# Patient Record
Sex: FEMALE | Race: WHITE | NOT HISPANIC OR LATINO | URBAN - METROPOLITAN AREA
[De-identification: names, ages, dates, MRNs, and addresses within clinical notes are randomized per-mention and may not be internally consistent; named-entity substitution may affect disease eponyms.]

---

## 2017-04-03 ENCOUNTER — OUTPATIENT (OUTPATIENT)
Dept: OUTPATIENT SERVICES | Facility: HOSPITAL | Age: 32
LOS: 1 days | End: 2017-04-03
Payer: COMMERCIAL

## 2017-04-03 VITALS
WEIGHT: 145.95 LBS | RESPIRATION RATE: 16 BRPM | SYSTOLIC BLOOD PRESSURE: 123 MMHG | OXYGEN SATURATION: 99 % | TEMPERATURE: 98 F | HEIGHT: 63 IN | HEART RATE: 62 BPM | DIASTOLIC BLOOD PRESSURE: 82 MMHG

## 2017-04-03 DIAGNOSIS — Z90.10 ACQUIRED ABSENCE OF UNSPECIFIED BREAST AND NIPPLE: Chronic | ICD-10-CM

## 2017-04-03 DIAGNOSIS — Z01.818 ENCOUNTER FOR OTHER PREPROCEDURAL EXAMINATION: ICD-10-CM

## 2017-04-03 DIAGNOSIS — Z90.13 ACQUIRED ABSENCE OF BILATERAL BREASTS AND NIPPLES: ICD-10-CM

## 2017-04-03 DIAGNOSIS — Z90.49 ACQUIRED ABSENCE OF OTHER SPECIFIED PARTS OF DIGESTIVE TRACT: Chronic | ICD-10-CM

## 2017-04-03 DIAGNOSIS — Z85.3 PERSONAL HISTORY OF MALIGNANT NEOPLASM OF BREAST: ICD-10-CM

## 2017-04-03 LAB
ANION GAP SERPL CALC-SCNC: 15 MMOL/L — SIGNIFICANT CHANGE UP (ref 5–17)
BUN SERPL-MCNC: 13 MG/DL — SIGNIFICANT CHANGE UP (ref 7–23)
CALCIUM SERPL-MCNC: 9.3 MG/DL — SIGNIFICANT CHANGE UP (ref 8.4–10.5)
CHLORIDE SERPL-SCNC: 103 MMOL/L — SIGNIFICANT CHANGE UP (ref 96–108)
CO2 SERPL-SCNC: 21 MMOL/L — LOW (ref 22–31)
CREAT SERPL-MCNC: 0.62 MG/DL — SIGNIFICANT CHANGE UP (ref 0.5–1.3)
GLUCOSE SERPL-MCNC: 125 MG/DL — HIGH (ref 70–99)
HCT VFR BLD CALC: 36.1 % — SIGNIFICANT CHANGE UP (ref 34.5–45)
HGB BLD-MCNC: 11.7 G/DL — SIGNIFICANT CHANGE UP (ref 11.5–15.5)
MCHC RBC-ENTMCNC: 30.9 PG — SIGNIFICANT CHANGE UP (ref 27–34)
MCHC RBC-ENTMCNC: 32.4 GM/DL — SIGNIFICANT CHANGE UP (ref 32–36)
MCV RBC AUTO: 95.3 FL — SIGNIFICANT CHANGE UP (ref 80–100)
PLATELET # BLD AUTO: 231 K/UL — SIGNIFICANT CHANGE UP (ref 150–400)
POTASSIUM SERPL-MCNC: 4.6 MMOL/L — SIGNIFICANT CHANGE UP (ref 3.5–5.3)
POTASSIUM SERPL-SCNC: 4.6 MMOL/L — SIGNIFICANT CHANGE UP (ref 3.5–5.3)
RBC # BLD: 3.79 M/UL — LOW (ref 3.8–5.2)
RBC # FLD: 13.2 % — SIGNIFICANT CHANGE UP (ref 10.3–14.5)
SODIUM SERPL-SCNC: 139 MMOL/L — SIGNIFICANT CHANGE UP (ref 135–145)
WBC # BLD: 6.23 K/UL — SIGNIFICANT CHANGE UP (ref 3.8–10.5)
WBC # FLD AUTO: 6.23 K/UL — SIGNIFICANT CHANGE UP (ref 3.8–10.5)

## 2017-04-03 PROCEDURE — 85027 COMPLETE CBC AUTOMATED: CPT

## 2017-04-03 PROCEDURE — 80048 BASIC METABOLIC PNL TOTAL CA: CPT

## 2017-04-03 RX ORDER — CEFAZOLIN SODIUM 1 G
2000 VIAL (EA) INJECTION ONCE
Qty: 0 | Refills: 0 | Status: DISCONTINUED | OUTPATIENT
Start: 2017-04-10 | End: 2017-04-25

## 2017-04-03 RX ORDER — SODIUM CHLORIDE 9 MG/ML
3 INJECTION INTRAMUSCULAR; INTRAVENOUS; SUBCUTANEOUS EVERY 8 HOURS
Qty: 0 | Refills: 0 | Status: DISCONTINUED | OUTPATIENT
Start: 2017-04-10 | End: 2017-04-25

## 2017-04-03 RX ORDER — CELECOXIB 200 MG/1
200 CAPSULE ORAL ONCE
Qty: 0 | Refills: 0 | Status: COMPLETED | OUTPATIENT
Start: 2017-04-10 | End: 2017-04-10

## 2017-04-03 RX ORDER — ACETAMINOPHEN 500 MG
975 TABLET ORAL ONCE
Qty: 0 | Refills: 0 | Status: COMPLETED | OUTPATIENT
Start: 2017-04-10 | End: 2017-04-10

## 2017-04-03 RX ORDER — LIDOCAINE HCL 20 MG/ML
0.2 VIAL (ML) INJECTION ONCE
Qty: 0 | Refills: 0 | Status: DISCONTINUED | OUTPATIENT
Start: 2017-04-10 | End: 2017-04-25

## 2017-04-03 RX ORDER — APREPITANT 80 MG/1
40 CAPSULE ORAL ONCE
Qty: 0 | Refills: 0 | Status: COMPLETED | OUTPATIENT
Start: 2017-04-10 | End: 2017-04-10

## 2017-04-03 NOTE — H&P PST ADULT - PSH
Normal spontaneous vaginal delivery  ' 2015  Status post appendectomy  ' 2008:  laproscopic  Status post mastectomy  ' 2016:  Bilateral MRM / Right Axillary Lymph Node Dissection ( 1 node +)

## 2017-04-03 NOTE — H&P PST ADULT - HISTORY OF PRESENT ILLNESS
This is a 32 y/o female with PMH: dx(9/2016): Right Breast Intraductal Carcinoma, intermediate grade: surgically treated/ chemotherapy and Tamoxifen. Now scheduled: Revision Bilateral Breast Reconstruction with Implants Acellular Dermal Matrix.

## 2017-04-09 ENCOUNTER — RESULT REVIEW (OUTPATIENT)
Age: 32
End: 2017-04-09

## 2017-04-10 ENCOUNTER — OUTPATIENT (OUTPATIENT)
Dept: OUTPATIENT SERVICES | Facility: HOSPITAL | Age: 32
LOS: 1 days | Discharge: HOME | End: 2017-04-10
Payer: COMMERCIAL

## 2017-04-10 VITALS
OXYGEN SATURATION: 100 % | RESPIRATION RATE: 16 BRPM | HEART RATE: 67 BPM | DIASTOLIC BLOOD PRESSURE: 90 MMHG | SYSTOLIC BLOOD PRESSURE: 136 MMHG

## 2017-04-10 VITALS
OXYGEN SATURATION: 100 % | RESPIRATION RATE: 18 BRPM | SYSTOLIC BLOOD PRESSURE: 120 MMHG | TEMPERATURE: 98 F | HEIGHT: 63 IN | WEIGHT: 145.95 LBS | HEART RATE: 68 BPM | DIASTOLIC BLOOD PRESSURE: 82 MMHG

## 2017-04-10 DIAGNOSIS — Z90.49 ACQUIRED ABSENCE OF OTHER SPECIFIED PARTS OF DIGESTIVE TRACT: Chronic | ICD-10-CM

## 2017-04-10 DIAGNOSIS — Z85.3 PERSONAL HISTORY OF MALIGNANT NEOPLASM OF BREAST: ICD-10-CM

## 2017-04-10 DIAGNOSIS — Z90.10 ACQUIRED ABSENCE OF UNSPECIFIED BREAST AND NIPPLE: Chronic | ICD-10-CM

## 2017-04-10 PROCEDURE — 19342 INSJ/RPLCMT BRST IMPLT SEP D: CPT | Mod: 50

## 2017-04-10 PROCEDURE — 88304 TISSUE EXAM BY PATHOLOGIST: CPT | Mod: 26

## 2017-04-10 PROCEDURE — C1789: CPT

## 2017-04-10 PROCEDURE — 88304 TISSUE EXAM BY PATHOLOGIST: CPT

## 2017-04-10 RX ORDER — CELECOXIB 200 MG/1
200 CAPSULE ORAL ONCE
Qty: 0 | Refills: 0 | Status: COMPLETED | OUTPATIENT
Start: 2017-04-10 | End: 2017-04-10

## 2017-04-10 RX ORDER — OXYCODONE HYDROCHLORIDE 5 MG/1
5 TABLET ORAL ONCE
Qty: 0 | Refills: 0 | Status: DISCONTINUED | OUTPATIENT
Start: 2017-04-10 | End: 2017-04-10

## 2017-04-10 RX ORDER — ONDANSETRON 8 MG/1
4 TABLET, FILM COATED ORAL ONCE
Qty: 0 | Refills: 0 | Status: COMPLETED | OUTPATIENT
Start: 2017-04-10 | End: 2017-04-10

## 2017-04-10 RX ORDER — SODIUM CHLORIDE 9 MG/ML
1000 INJECTION, SOLUTION INTRAVENOUS
Qty: 0 | Refills: 0 | Status: DISCONTINUED | OUTPATIENT
Start: 2017-04-10 | End: 2017-04-25

## 2017-04-10 RX ORDER — HYDROMORPHONE HYDROCHLORIDE 2 MG/ML
0.5 INJECTION INTRAMUSCULAR; INTRAVENOUS; SUBCUTANEOUS
Qty: 0 | Refills: 0 | Status: DISCONTINUED | OUTPATIENT
Start: 2017-04-10 | End: 2017-04-10

## 2017-04-10 RX ADMIN — CELECOXIB 200 MILLIGRAM(S): 200 CAPSULE ORAL at 09:33

## 2017-04-10 RX ADMIN — CELECOXIB 200 MILLIGRAM(S): 200 CAPSULE ORAL at 12:12

## 2017-04-10 RX ADMIN — OXYCODONE HYDROCHLORIDE 5 MILLIGRAM(S): 5 TABLET ORAL at 12:12

## 2017-04-10 RX ADMIN — Medication 975 MILLIGRAM(S): at 09:33

## 2017-04-10 RX ADMIN — Medication 975 MILLIGRAM(S): at 12:55

## 2017-04-10 RX ADMIN — HYDROMORPHONE HYDROCHLORIDE 0.5 MILLIGRAM(S): 2 INJECTION INTRAMUSCULAR; INTRAVENOUS; SUBCUTANEOUS at 13:15

## 2017-04-10 RX ADMIN — APREPITANT 40 MILLIGRAM(S): 80 CAPSULE ORAL at 09:33

## 2017-04-10 RX ADMIN — ONDANSETRON 4 MILLIGRAM(S): 8 TABLET, FILM COATED ORAL at 12:12

## 2017-04-13 LAB — SURGICAL PATHOLOGY STUDY: SIGNIFICANT CHANGE UP

## 2017-04-17 ENCOUNTER — TRANSCRIPTION ENCOUNTER (OUTPATIENT)
Age: 32
End: 2017-04-17

## 2017-04-19 DIAGNOSIS — Z79.810 LONG TERM (CURRENT) USE OF SELECTIVE ESTROGEN RECEPTOR MODULATORS (SERMS): ICD-10-CM

## 2017-04-19 DIAGNOSIS — Z85.3 PERSONAL HISTORY OF MALIGNANT NEOPLASM OF BREAST: ICD-10-CM

## 2017-04-19 DIAGNOSIS — M95.4 ACQUIRED DEFORMITY OF CHEST AND RIB: ICD-10-CM

## 2017-04-19 DIAGNOSIS — Z90.13 ACQUIRED ABSENCE OF BILATERAL BREASTS AND NIPPLES: ICD-10-CM

## 2018-01-04 ENCOUNTER — RESULT REVIEW (OUTPATIENT)
Age: 33
End: 2018-01-04

## 2018-05-24 ENCOUNTER — NON-APPOINTMENT (OUTPATIENT)
Age: 33
End: 2018-05-24

## 2018-05-24 ENCOUNTER — APPOINTMENT (OUTPATIENT)
Dept: INTERNAL MEDICINE | Facility: CLINIC | Age: 33
End: 2018-05-24
Payer: COMMERCIAL

## 2018-05-24 VITALS — WEIGHT: 140 LBS | BODY MASS INDEX: 24.5 KG/M2 | HEIGHT: 63.5 IN

## 2018-05-24 DIAGNOSIS — Z78.9 OTHER SPECIFIED HEALTH STATUS: ICD-10-CM

## 2018-05-24 DIAGNOSIS — Z86.19 PERSONAL HISTORY OF OTHER INFECTIOUS AND PARASITIC DISEASES: ICD-10-CM

## 2018-05-24 DIAGNOSIS — Z82.3 FAMILY HISTORY OF STROKE: ICD-10-CM

## 2018-05-24 DIAGNOSIS — C50.911 MALIGNANT NEOPLASM OF UNSPECIFIED SITE OF RIGHT FEMALE BREAST: ICD-10-CM

## 2018-05-24 PROCEDURE — 93000 ELECTROCARDIOGRAM COMPLETE: CPT

## 2018-05-24 PROCEDURE — 99385 PREV VISIT NEW AGE 18-39: CPT | Mod: 25

## 2018-05-24 PROCEDURE — 94010 BREATHING CAPACITY TEST: CPT

## 2018-05-24 PROCEDURE — 99000 SPECIMEN HANDLING OFFICE-LAB: CPT

## 2018-05-24 PROCEDURE — 36415 COLL VENOUS BLD VENIPUNCTURE: CPT

## 2018-05-24 RX ORDER — CHROMIUM 200 MCG
1000 TABLET ORAL DAILY
Qty: 30 | Refills: 3 | Status: ACTIVE | COMMUNITY
Start: 2018-05-24

## 2018-05-24 NOTE — PHYSICAL EXAM

## 2018-05-24 NOTE — ASSESSMENT
[FreeTextEntry1] : Blood work was drawn and sent to the lab today. The patient has been instructed to call the office next week to discuss today's lab work.\par \par Routine health counselling provided\par \par Onc/GYN f/u\par Optho evaluation per routine\par \par Annual CC

## 2018-05-24 NOTE — HISTORY OF PRESENT ILLNESS
[de-identified] : Pt presents for her annual physical. \par She had breast CA in 2016. Has regular f/u with oncology.\par No acute complaints except for occasional tenderness of scalp.\par Feels bloated from Tamoxifen

## 2018-05-24 NOTE — HEALTH RISK ASSESSMENT
[Excellent] : ~his/her~  mood as  excellent [No falls in past year] : Patient reported no falls in the past year [0] : 2) Feeling down, depressed, or hopeless: Not at all (0) [With Significant Other] : lives with significant other [Unemployed] : unemployed [] :  [# Of Children ___] : has [unfilled] children [Sexually Active] : sexually active [Feels Safe at Home] : Feels safe at home [Fully functional (bathing, dressing, toileting, transferring, walking, feeding)] : Fully functional (bathing, dressing, toileting, transferring, walking, feeding) [Fully functional (using the telephone, shopping, preparing meals, housekeeping, doing laundry, using] : Fully functional and needs no help or supervision to perform IADLs (using the telephone, shopping, preparing meals, housekeeping, doing laundry, using transportation, managing medications and managing finances) [Smoke Detector] : smoke detector [Carbon Monoxide Detector] : carbon monoxide detector [Seat Belt] :  uses seat belt [] : No [[id number]] : 0 [Change in mental status noted] : No change in mental status noted [Language] : denies difficulty with language [Reports changes in hearing] : Reports no changes in hearing [Reports changes in vision] : Reports no changes in vision [Reports changes in dental health] : Reports no changes in dental health

## 2018-06-01 LAB
25(OH)D3 SERPL-MCNC: 53.4 NG/ML
ALBUMIN SERPL ELPH-MCNC: 4.7 G/DL
ALP BLD-CCNC: 30 U/L
ALT SERPL-CCNC: 24 U/L
ANION GAP SERPL CALC-SCNC: 15 MMOL/L
APPEARANCE: CLEAR
AST SERPL-CCNC: 36 U/L
BASOPHILS # BLD AUTO: 0.03 K/UL
BASOPHILS NFR BLD AUTO: 0.5 %
BILIRUB SERPL-MCNC: 0.5 MG/DL
BILIRUBIN URINE: NEGATIVE
BLOOD URINE: NEGATIVE
BUN SERPL-MCNC: 11 MG/DL
CALCIUM SERPL-MCNC: 9.8 MG/DL
CHLORIDE SERPL-SCNC: 101 MMOL/L
CHOLEST SERPL-MCNC: 174 MG/DL
CHOLEST/HDLC SERPL: 2.2 RATIO
CO2 SERPL-SCNC: 23 MMOL/L
COLOR: YELLOW
CREAT SERPL-MCNC: 0.63 MG/DL
EOSINOPHIL # BLD AUTO: 0.2 K/UL
EOSINOPHIL NFR BLD AUTO: 3.6 %
FOLATE SERPL-MCNC: >20 NG/ML
GLUCOSE QUALITATIVE U: NEGATIVE MG/DL
GLUCOSE SERPL-MCNC: 86 MG/DL
HBA1C MFR BLD HPLC: 4.5 %
HCT VFR BLD CALC: 37.5 %
HCV AB SER QL: NONREACTIVE
HCV S/CO RATIO: 0.1 S/CO
HDLC SERPL-MCNC: 80 MG/DL
HGB BLD-MCNC: 11.6 G/DL
HIV1+2 AB SPEC QL IA.RAPID: NONREACTIVE
IMM GRANULOCYTES NFR BLD AUTO: 0 %
KETONES URINE: NEGATIVE
LDLC SERPL CALC-MCNC: 76 MG/DL
LEUKOCYTE ESTERASE URINE: NEGATIVE
LYMPHOCYTES # BLD AUTO: 1.51 K/UL
LYMPHOCYTES NFR BLD AUTO: 27 %
MAGNESIUM SERPL-MCNC: 2.5 MG/DL
MAN DIFF?: NORMAL
MCHC RBC-ENTMCNC: 30.6 PG
MCHC RBC-ENTMCNC: 30.9 GM/DL
MCV RBC AUTO: 98.9 FL
MONOCYTES # BLD AUTO: 0.46 K/UL
MONOCYTES NFR BLD AUTO: 8.2 %
NEUTROPHILS # BLD AUTO: 3.4 K/UL
NEUTROPHILS NFR BLD AUTO: 60.7 %
NITRITE URINE: NEGATIVE
PH URINE: 8.5
PLATELET # BLD AUTO: 314 K/UL
POTASSIUM SERPL-SCNC: 4.3 MMOL/L
PROT SERPL-MCNC: 7.7 G/DL
PROTEIN URINE: NEGATIVE MG/DL
RBC # BLD: 3.79 M/UL
RBC # FLD: 13.8 %
SODIUM SERPL-SCNC: 139 MMOL/L
SPECIFIC GRAVITY URINE: 1.01
T4 FREE SERPL-MCNC: 1.3 NG/DL
T4 SERPL-MCNC: 6.9 UG/DL
TRIGL SERPL-MCNC: 91 MG/DL
TSH SERPL-ACNC: 1.35 UIU/ML
UROBILINOGEN URINE: NEGATIVE MG/DL
VIT B12 SERPL-MCNC: 677 PG/ML
WBC # FLD AUTO: 5.6 K/UL

## 2019-02-03 ENCOUNTER — RESULT REVIEW (OUTPATIENT)
Age: 34
End: 2019-02-03

## 2020-07-30 ENCOUNTER — APPOINTMENT (OUTPATIENT)
Dept: INTERNAL MEDICINE | Facility: CLINIC | Age: 35
End: 2020-07-30
Payer: COMMERCIAL

## 2020-07-30 ENCOUNTER — LABORATORY RESULT (OUTPATIENT)
Age: 35
End: 2020-07-30

## 2020-07-30 ENCOUNTER — NON-APPOINTMENT (OUTPATIENT)
Age: 35
End: 2020-07-30

## 2020-07-30 VITALS
OXYGEN SATURATION: 100 % | BODY MASS INDEX: 24.71 KG/M2 | WEIGHT: 143 LBS | RESPIRATION RATE: 16 BRPM | HEART RATE: 64 BPM | HEIGHT: 63.6 IN | TEMPERATURE: 98.2 F

## 2020-07-30 VITALS — SYSTOLIC BLOOD PRESSURE: 122 MMHG | DIASTOLIC BLOOD PRESSURE: 78 MMHG

## 2020-07-30 DIAGNOSIS — Z11.59 ENCOUNTER FOR SCREENING FOR OTHER VIRAL DISEASES: ICD-10-CM

## 2020-07-30 PROCEDURE — 99000 SPECIMEN HANDLING OFFICE-LAB: CPT

## 2020-07-30 PROCEDURE — 36415 COLL VENOUS BLD VENIPUNCTURE: CPT

## 2020-07-30 PROCEDURE — 99395 PREV VISIT EST AGE 18-39: CPT | Mod: 25

## 2020-07-30 PROCEDURE — 93000 ELECTROCARDIOGRAM COMPLETE: CPT | Mod: 59

## 2020-07-30 PROCEDURE — G0444 DEPRESSION SCREEN ANNUAL: CPT | Mod: NC,59

## 2020-07-30 RX ORDER — NICOTINE POLACRILEX 2 MG
1 GUM BUCCAL
Refills: 0 | Status: DISCONTINUED | COMMUNITY
Start: 2018-05-24 | End: 2020-07-30

## 2020-07-30 NOTE — HISTORY OF PRESENT ILLNESS
[de-identified] : Pt presents for her annual physical. \par She had breast CA in 2016. Has regular f/u with oncology.\par No acute complaints except for bloating from Tamoxifen

## 2020-07-30 NOTE — ASSESSMENT
[FreeTextEntry1] : Blood work was drawn and sent to the lab today. The patient has been instructed to call the office next week to discuss today's lab work.\par \par Routine health counselling provided\par \par Onc f/u every 6 months\par GYN f/u\par Optho evaluation per routine\par \par Flu vaccine in fall\par \par Annual CC

## 2020-07-30 NOTE — HEALTH RISK ASSESSMENT
[Excellent] : ~his/her~  mood as  excellent [Yes] : Yes [No] : In the past 12 months have you used drugs other than those required for medical reasons? No [0] : 2) Feeling down, depressed, or hopeless: Not at all (0) [No falls in past year] : Patient reported no falls in the past year [With Significant Other] : lives with significant other [Unemployed] : unemployed [# Of Children ___] : has [unfilled] children [] :  [Sexually Active] : sexually active [Feels Safe at Home] : Feels safe at home [Fully functional (bathing, dressing, toileting, transferring, walking, feeding)] : Fully functional (bathing, dressing, toileting, transferring, walking, feeding) [Fully functional (using the telephone, shopping, preparing meals, housekeeping, doing laundry, using] : Fully functional and needs no help or supervision to perform IADLs (using the telephone, shopping, preparing meals, housekeeping, doing laundry, using transportation, managing medications and managing finances) [Carbon Monoxide Detector] : carbon monoxide detector [Smoke Detector] : smoke detector [Seat Belt] :  uses seat belt [] : No [[id number]] : 0 [Change in mental status noted] : No change in mental status noted [Language] : denies difficulty with language [Reports changes in vision] : Reports no changes in vision [Reports changes in hearing] : Reports no changes in hearing [Reports changes in dental health] : Reports no changes in dental health

## 2020-07-30 NOTE — PHYSICAL EXAM
[No Acute Distress] : no acute distress [Well Nourished] : well nourished [Well Developed] : well developed [Well-Appearing] : well-appearing [Normal Sclera/Conjunctiva] : normal sclera/conjunctiva [PERRL] : pupils equal round and reactive to light [EOMI] : extraocular movements intact [Normal Outer Ear/Nose] : the outer ears and nose were normal in appearance [Normal Oropharynx] : the oropharynx was normal [No JVD] : no jugular venous distention [Supple] : supple [No Lymphadenopathy] : no lymphadenopathy [Thyroid Normal, No Nodules] : the thyroid was normal and there were no nodules present [No Respiratory Distress] : no respiratory distress  [Clear to Auscultation] : lungs were clear to auscultation bilaterally [No Accessory Muscle Use] : no accessory muscle use [Normal Rate] : normal rate  [Regular Rhythm] : with a regular rhythm [Normal S1, S2] : normal S1 and S2 [No Murmur] : no murmur heard [No Carotid Bruits] : no carotid bruits [No Abdominal Bruit] : a ~M bruit was not heard ~T in the abdomen [No Varicosities] : no varicosities [Pedal Pulses Present] : the pedal pulses are present [No Edema] : there was no peripheral edema [No Extremity Clubbing/Cyanosis] : no extremity clubbing/cyanosis [No Palpable Aorta] : no palpable aorta [Declined Breast Exam] : declined breast exam  [Soft] : abdomen soft [Non Tender] : non-tender [Non-distended] : non-distended [No HSM] : no HSM [No Masses] : no abdominal mass palpated [Normal Bowel Sounds] : normal bowel sounds [Normal Posterior Cervical Nodes] : no posterior cervical lymphadenopathy [Normal Anterior Cervical Nodes] : no anterior cervical lymphadenopathy [No CVA Tenderness] : no CVA  tenderness [No Spinal Tenderness] : no spinal tenderness [No Joint Swelling] : no joint swelling [Grossly Normal Strength/Tone] : grossly normal strength/tone [No Rash] : no rash [Normal Gait] : normal gait [Coordination Grossly Intact] : coordination grossly intact [No Focal Deficits] : no focal deficits [Deep Tendon Reflexes (DTR)] : deep tendon reflexes were 2+ and symmetric [Normal Affect] : the affect was normal [Normal Insight/Judgement] : insight and judgment were intact [de-identified] : b/l implants [de-identified] : emily Jordan  PAP 2/2020 neg [de-identified] : [facility]  FBE annually 6/20

## 2020-08-07 LAB
25(OH)D3 SERPL-MCNC: 66.1 NG/ML
ALBUMIN SERPL ELPH-MCNC: 4.9 G/DL
ALP BLD-CCNC: 38 U/L
ALT SERPL-CCNC: 16 U/L
ANION GAP SERPL CALC-SCNC: 13 MMOL/L
APPEARANCE: ABNORMAL
AST SERPL-CCNC: 23 U/L
BASOPHILS # BLD AUTO: 0.05 K/UL
BASOPHILS NFR BLD AUTO: 0.8 %
BILIRUB SERPL-MCNC: 1 MG/DL
BILIRUBIN URINE: NEGATIVE
BLOOD URINE: NEGATIVE
BUN SERPL-MCNC: 11 MG/DL
CALCIUM SERPL-MCNC: 10 MG/DL
CHLORIDE SERPL-SCNC: 104 MMOL/L
CHOLEST SERPL-MCNC: 145 MG/DL
CHOLEST/HDLC SERPL: 2.2 RATIO
CO2 SERPL-SCNC: 24 MMOL/L
COLOR: NORMAL
CREAT SERPL-MCNC: 0.69 MG/DL
EOSINOPHIL # BLD AUTO: 0.09 K/UL
EOSINOPHIL NFR BLD AUTO: 1.5 %
FOLATE SERPL-MCNC: >20 NG/ML
GLUCOSE QUALITATIVE U: NEGATIVE
GLUCOSE SERPL-MCNC: 83 MG/DL
HCT VFR BLD CALC: 38.9 %
HDLC SERPL-MCNC: 67 MG/DL
HGB BLD-MCNC: 12.2 G/DL
IMM GRANULOCYTES NFR BLD AUTO: 0.2 %
KETONES URINE: ABNORMAL
LDLC SERPL CALC-MCNC: 67 MG/DL
LEUKOCYTE ESTERASE URINE: ABNORMAL
LYMPHOCYTES # BLD AUTO: 1.56 K/UL
LYMPHOCYTES NFR BLD AUTO: 25.6 %
MAGNESIUM SERPL-MCNC: 2.1 MG/DL
MAN DIFF?: NORMAL
MCHC RBC-ENTMCNC: 30 PG
MCHC RBC-ENTMCNC: 31.4 GM/DL
MCV RBC AUTO: 95.8 FL
MONOCYTES # BLD AUTO: 0.7 K/UL
MONOCYTES NFR BLD AUTO: 11.5 %
NEUTROPHILS # BLD AUTO: 3.69 K/UL
NEUTROPHILS NFR BLD AUTO: 60.4 %
NITRITE URINE: NEGATIVE
PH URINE: 5.5
PLATELET # BLD AUTO: 240 K/UL
POTASSIUM SERPL-SCNC: 4.8 MMOL/L
PROT SERPL-MCNC: 7.1 G/DL
PROTEIN URINE: NEGATIVE
RBC # BLD: 4.06 M/UL
RBC # FLD: 13.3 %
SARS-COV-2 IGG SERPL IA-ACNC: 0.09 INDEX
SARS-COV-2 IGG SERPL QL IA: NEGATIVE
SODIUM SERPL-SCNC: 141 MMOL/L
SPECIFIC GRAVITY URINE: 1.01
T4 FREE SERPL-MCNC: 1 NG/DL
T4 SERPL-MCNC: 6.5 UG/DL
TRIGL SERPL-MCNC: 54 MG/DL
TSH SERPL-ACNC: 1.26 UIU/ML
UROBILINOGEN URINE: NORMAL
VIT B12 SERPL-MCNC: 663 PG/ML
WBC # FLD AUTO: 6.1 K/UL

## 2020-11-20 ENCOUNTER — TRANSCRIPTION ENCOUNTER (OUTPATIENT)
Age: 35
End: 2020-11-20

## 2021-01-28 ENCOUNTER — APPOINTMENT (OUTPATIENT)
Dept: PLASTIC SURGERY | Facility: CLINIC | Age: 36
End: 2021-01-28

## 2021-02-18 ENCOUNTER — APPOINTMENT (OUTPATIENT)
Dept: PLASTIC SURGERY | Facility: CLINIC | Age: 36
End: 2021-02-18
Payer: COMMERCIAL

## 2021-02-18 VITALS
OXYGEN SATURATION: 98 % | BODY MASS INDEX: 24.2 KG/M2 | SYSTOLIC BLOOD PRESSURE: 122 MMHG | HEART RATE: 71 BPM | HEIGHT: 63.6 IN | TEMPERATURE: 98.3 F | DIASTOLIC BLOOD PRESSURE: 81 MMHG | WEIGHT: 140 LBS

## 2021-02-18 DIAGNOSIS — Z92.21 PERSONAL HISTORY OF ANTINEOPLASTIC CHEMOTHERAPY: ICD-10-CM

## 2021-02-18 PROCEDURE — 99204 OFFICE O/P NEW MOD 45 MIN: CPT

## 2021-02-18 PROCEDURE — 99072 ADDL SUPL MATRL&STAF TM PHE: CPT

## 2021-02-25 ENCOUNTER — APPOINTMENT (OUTPATIENT)
Dept: PLASTIC SURGERY | Facility: CLINIC | Age: 36
End: 2021-02-25
Payer: COMMERCIAL

## 2021-02-25 VITALS
BODY MASS INDEX: 24.2 KG/M2 | SYSTOLIC BLOOD PRESSURE: 123 MMHG | HEART RATE: 63 BPM | DIASTOLIC BLOOD PRESSURE: 78 MMHG | WEIGHT: 140 LBS | TEMPERATURE: 98.1 F | HEIGHT: 63.6 IN | OXYGEN SATURATION: 100 %

## 2021-02-25 PROCEDURE — 99072 ADDL SUPL MATRL&STAF TM PHE: CPT

## 2021-02-25 PROCEDURE — 99214 OFFICE O/P EST MOD 30 MIN: CPT

## 2021-03-26 ENCOUNTER — APPOINTMENT (OUTPATIENT)
Dept: PLASTIC SURGERY | Facility: CLINIC | Age: 36
End: 2021-03-26
Payer: COMMERCIAL

## 2021-03-26 PROCEDURE — 99215 OFFICE O/P EST HI 40 MIN: CPT

## 2021-03-26 PROCEDURE — 99072 ADDL SUPL MATRL&STAF TM PHE: CPT

## 2021-03-26 NOTE — HISTORY OF PRESENT ILLNESS
[FreeTextEntry1] : 35 y.o.  F history of right breast CA diagnosed in 2016 (ER+, [place]+, HER2 -) s/p b/l mastectomies with right SLNBx (1+ micromets/6 lymph nodes), placement of b/l subpectoral tissue expanders s/p exchange for subpectoral silicone implants (mentor style III  smooth ultra high profile 535cc implant) in 2017. Pt unhappy with the appearance of her breasts and seeking revision options. She was seen by Dr. Shepherd and scheduled to undergo surgery with exchange of b/l subpectoral implants, placement of pre pectoral implants and ADM, she presents today for preoperative evaluation. \par \par Patient has 2 children, one  and one via surrogate. \par \par Genetic testing negative. \par \par Breast surgeon Dr. Lam\par Plastic surgeon Dr. Christina

## 2021-03-26 NOTE — REASON FOR VISIT
[Consultation] : a consultation visit [Spouse] : spouse [FreeTextEntry1] : breast reconstruction revisoin

## 2021-03-26 NOTE — PHYSICAL EXAM
[de-identified] : b/l breasts soft, nt. incisions well healed. +subpectoral implants. laterallly displaced. IMF ablated. significant hyperanimation deformity. Implants protrude laterally and there is a lack of cleavage.There is some superior hollowing as well as axillary hollowing.The skin and subcutaneous fat is somewhat limited in thickness superiorly. [de-identified] : small pannus. Could do a Hybrid but would need to go down in volume a little.

## 2021-04-12 ENCOUNTER — OUTPATIENT (OUTPATIENT)
Dept: OUTPATIENT SERVICES | Facility: HOSPITAL | Age: 36
LOS: 1 days | End: 2021-04-12
Payer: COMMERCIAL

## 2021-04-12 VITALS
WEIGHT: 141.54 LBS | OXYGEN SATURATION: 100 % | SYSTOLIC BLOOD PRESSURE: 130 MMHG | RESPIRATION RATE: 18 BRPM | HEART RATE: 66 BPM | DIASTOLIC BLOOD PRESSURE: 88 MMHG | HEIGHT: 64 IN | TEMPERATURE: 98 F

## 2021-04-12 DIAGNOSIS — Z90.49 ACQUIRED ABSENCE OF OTHER SPECIFIED PARTS OF DIGESTIVE TRACT: Chronic | ICD-10-CM

## 2021-04-12 DIAGNOSIS — N65.0 DEFORMITY OF RECONSTRUCTED BREAST: ICD-10-CM

## 2021-04-12 DIAGNOSIS — Z90.10 ACQUIRED ABSENCE OF UNSPECIFIED BREAST AND NIPPLE: Chronic | ICD-10-CM

## 2021-04-12 DIAGNOSIS — Z01.818 ENCOUNTER FOR OTHER PREPROCEDURAL EXAMINATION: ICD-10-CM

## 2021-04-12 DIAGNOSIS — Z42.1 ENCOUNTER FOR BREAST RECONSTRUCTION FOLLOWING MASTECTOMY: ICD-10-CM

## 2021-04-12 DIAGNOSIS — Z85.3 PERSONAL HISTORY OF MALIGNANT NEOPLASM OF BREAST: ICD-10-CM

## 2021-04-12 DIAGNOSIS — Z90.13 ACQUIRED ABSENCE OF BILATERAL BREASTS AND NIPPLES: ICD-10-CM

## 2021-04-12 DIAGNOSIS — N65.1 DISPROPORTION OF RECONSTRUCTED BREAST: ICD-10-CM

## 2021-04-12 LAB
ANION GAP SERPL CALC-SCNC: 7 MMOL/L — SIGNIFICANT CHANGE UP (ref 5–17)
BUN SERPL-MCNC: 11 MG/DL — SIGNIFICANT CHANGE UP (ref 7–23)
CALCIUM SERPL-MCNC: 9.4 MG/DL — SIGNIFICANT CHANGE UP (ref 8.4–10.5)
CHLORIDE SERPL-SCNC: 108 MMOL/L — SIGNIFICANT CHANGE UP (ref 96–108)
CO2 SERPL-SCNC: 29 MMOL/L — SIGNIFICANT CHANGE UP (ref 22–31)
CREAT SERPL-MCNC: 0.7 MG/DL — SIGNIFICANT CHANGE UP (ref 0.5–1.3)
GLUCOSE SERPL-MCNC: 96 MG/DL — SIGNIFICANT CHANGE UP (ref 70–99)
HCT VFR BLD CALC: 37.3 % — SIGNIFICANT CHANGE UP (ref 34.5–45)
HGB BLD-MCNC: 12.1 G/DL — SIGNIFICANT CHANGE UP (ref 11.5–15.5)
MCHC RBC-ENTMCNC: 31 PG — SIGNIFICANT CHANGE UP (ref 27–34)
MCHC RBC-ENTMCNC: 32.4 GM/DL — SIGNIFICANT CHANGE UP (ref 32–36)
MCV RBC AUTO: 95.6 FL — SIGNIFICANT CHANGE UP (ref 80–100)
NRBC # BLD: 0 /100 WBCS — SIGNIFICANT CHANGE UP (ref 0–0)
PLATELET # BLD AUTO: 280 K/UL — SIGNIFICANT CHANGE UP (ref 150–400)
POTASSIUM SERPL-MCNC: 4.1 MMOL/L — SIGNIFICANT CHANGE UP (ref 3.5–5.3)
POTASSIUM SERPL-SCNC: 4.1 MMOL/L — SIGNIFICANT CHANGE UP (ref 3.5–5.3)
RBC # BLD: 3.9 M/UL — SIGNIFICANT CHANGE UP (ref 3.8–5.2)
RBC # FLD: 14.5 % — SIGNIFICANT CHANGE UP (ref 10.3–14.5)
SODIUM SERPL-SCNC: 144 MMOL/L — SIGNIFICANT CHANGE UP (ref 135–145)
WBC # BLD: 5.89 K/UL — SIGNIFICANT CHANGE UP (ref 3.8–10.5)
WBC # FLD AUTO: 5.89 K/UL — SIGNIFICANT CHANGE UP (ref 3.8–10.5)

## 2021-04-12 PROCEDURE — 85027 COMPLETE CBC AUTOMATED: CPT

## 2021-04-12 PROCEDURE — 80048 BASIC METABOLIC PNL TOTAL CA: CPT

## 2021-04-12 PROCEDURE — 36415 COLL VENOUS BLD VENIPUNCTURE: CPT

## 2021-04-12 PROCEDURE — 71046 X-RAY EXAM CHEST 2 VIEWS: CPT | Mod: 26

## 2021-04-12 PROCEDURE — G0463: CPT

## 2021-04-12 PROCEDURE — 71046 X-RAY EXAM CHEST 2 VIEWS: CPT

## 2021-04-12 RX ORDER — FAMOTIDINE 10 MG/ML
0 INJECTION INTRAVENOUS
Qty: 0 | Refills: 0 | DISCHARGE

## 2021-04-12 RX ORDER — ASCORBIC ACID 60 MG
1 TABLET,CHEWABLE ORAL
Qty: 0 | Refills: 0 | DISCHARGE

## 2021-04-12 RX ORDER — VITAMIN E 100 UNIT
1 CAPSULE ORAL
Qty: 0 | Refills: 0 | DISCHARGE

## 2021-04-12 RX ORDER — TAMOXIFEN CITRATE 20 MG/1
1 TABLET, FILM COATED ORAL
Qty: 0 | Refills: 0 | DISCHARGE

## 2021-04-12 RX ORDER — CHOLECALCIFEROL (VITAMIN D3) 125 MCG
1 CAPSULE ORAL
Qty: 0 | Refills: 0 | DISCHARGE

## 2021-04-12 NOTE — H&P PST ADULT - NSICDXPROBLEM_GEN_ALL_CORE_FT
PROBLEM DIAGNOSES  Problem: Encounter for breast reconstruction following mastectomy  Assessment and Plan: Schedulef ro revision of bilateral breast w/ tissues expander and acellular dermal matrix on 04/23/2021.  Pre op instructions given and patient verbalized under standing.  CBC,BMP, Chest xray, pending.  COVID-19 test TBS- information provided.  Chlorhexidine wash given with instructions.

## 2021-04-12 NOTE — H&P PST ADULT - NSICDXPASTSURGICALHX_GEN_ALL_CORE_FT
PAST SURGICAL HISTORY:  Normal spontaneous vaginal delivery ' 2015    Status post appendectomy ' 2008:  laproscopic    Status post mastectomy ' 2016:  Bilateral MRM / Right Axillary Lymph Node Dissection ( 1 node +), implant exchange - 2017

## 2021-04-12 NOTE — H&P PST ADULT - NSANTHOSAYNRD_GEN_A_CORE
neck 13 inches/No. AMANDA screening performed.  STOP BANG Legend: 0-2 = LOW Risk; 3-4 = INTERMEDIATE Risk; 5-8 = HIGH Risk

## 2021-04-12 NOTE — H&P PST ADULT - BREASTS COMMENTS
S/P bilateral mastectomy with reconstruction- mild tenderness on right breast with palpation, neg erythema or discharge

## 2021-04-12 NOTE — H&P PST ADULT - GRAVIDA, OB PROFILE

## 2021-04-12 NOTE — H&P PST ADULT - HISTORY OF PRESENT ILLNESS
This is a 30 y/o female with PMH: dx(9/2016): Right Breast Intraductal Carcinoma, intermediate grade: surgically treated/ chemotherapy and Tamoxifen. Now scheduled: Revision Bilateral Breast Reconstruction with Implants Acellular Dermal Matrix.  This is a 36 y/o female with PMH: dx(9/2016): Right Breast Intraductal Carcinoma, surgically treated/ chemotherapy (completed 2017)  and Tamoxifen. Now scheduled for revision of bilateral breast reconstruction  w/ tissue expanders and acellular dermal matrix with Dr Shepherd on 04/23/2021.  COVID-19 TBS - information provided

## 2021-04-12 NOTE — H&P PST ADULT - NSICDXPASTMEDICALHX_GEN_ALL_CORE_FT
PAST MEDICAL HISTORY:  COVID-19 1/2021 - mild case    Intraductal carcinoma of right breast dx: 9/2016

## 2021-04-13 PROBLEM — U07.1 COVID-19: Chronic | Status: ACTIVE | Noted: 2021-04-12

## 2021-04-20 ENCOUNTER — APPOINTMENT (OUTPATIENT)
Dept: DISASTER EMERGENCY | Facility: CLINIC | Age: 36
End: 2021-04-20

## 2021-04-20 DIAGNOSIS — Z01.818 ENCOUNTER FOR OTHER PREPROCEDURAL EXAMINATION: ICD-10-CM

## 2021-04-20 LAB — SARS-COV-2 N GENE NPH QL NAA+PROBE: NOT DETECTED

## 2021-04-22 ENCOUNTER — TRANSCRIPTION ENCOUNTER (OUTPATIENT)
Age: 36
End: 2021-04-22

## 2021-04-23 ENCOUNTER — OUTPATIENT (OUTPATIENT)
Dept: OUTPATIENT SERVICES | Facility: HOSPITAL | Age: 36
LOS: 1 days | End: 2021-04-23
Payer: COMMERCIAL

## 2021-04-23 ENCOUNTER — APPOINTMENT (OUTPATIENT)
Dept: PLASTIC SURGERY | Facility: HOSPITAL | Age: 36
End: 2021-04-23
Payer: COMMERCIAL

## 2021-04-23 ENCOUNTER — RESULT REVIEW (OUTPATIENT)
Age: 36
End: 2021-04-23

## 2021-04-23 VITALS
OXYGEN SATURATION: 97 % | SYSTOLIC BLOOD PRESSURE: 102 MMHG | RESPIRATION RATE: 16 BRPM | TEMPERATURE: 98 F | HEART RATE: 62 BPM | DIASTOLIC BLOOD PRESSURE: 63 MMHG

## 2021-04-23 VITALS
WEIGHT: 141.54 LBS | RESPIRATION RATE: 18 BRPM | OXYGEN SATURATION: 98 % | HEIGHT: 64 IN | DIASTOLIC BLOOD PRESSURE: 72 MMHG | TEMPERATURE: 97 F | SYSTOLIC BLOOD PRESSURE: 115 MMHG | HEART RATE: 65 BPM

## 2021-04-23 DIAGNOSIS — N65.0 DEFORMITY OF RECONSTRUCTED BREAST: ICD-10-CM

## 2021-04-23 DIAGNOSIS — Z90.49 ACQUIRED ABSENCE OF OTHER SPECIFIED PARTS OF DIGESTIVE TRACT: Chronic | ICD-10-CM

## 2021-04-23 DIAGNOSIS — Z85.3 PERSONAL HISTORY OF MALIGNANT NEOPLASM OF BREAST: ICD-10-CM

## 2021-04-23 DIAGNOSIS — Z90.10 ACQUIRED ABSENCE OF UNSPECIFIED BREAST AND NIPPLE: Chronic | ICD-10-CM

## 2021-04-23 DIAGNOSIS — Z42.1 ENCOUNTER FOR BREAST RECONSTRUCTION FOLLOWING MASTECTOMY: ICD-10-CM

## 2021-04-23 DIAGNOSIS — Z90.13 ACQUIRED ABSENCE OF BILATERAL BREASTS AND NIPPLES: ICD-10-CM

## 2021-04-23 DIAGNOSIS — N65.1 DISPROPORTION OF RECONSTRUCTED BREAST: ICD-10-CM

## 2021-04-23 PROCEDURE — 19328 RMVL INTACT BREAST IMPLANT: CPT | Mod: 50

## 2021-04-23 PROCEDURE — 15777 ACELLULAR DERM MATRIX IMPLT: CPT | Mod: RT

## 2021-04-23 PROCEDURE — 15734 MUSCLE-SKIN GRAFT TRUNK: CPT | Mod: LT,59

## 2021-04-23 PROCEDURE — 88300 SURGICAL PATH GROSS: CPT

## 2021-04-23 PROCEDURE — C1889: CPT

## 2021-04-23 PROCEDURE — 88304 TISSUE EXAM BY PATHOLOGIST: CPT | Mod: 26,59

## 2021-04-23 PROCEDURE — 19371 PERI-IMPLT CAPSLC BRST COMPL: CPT | Mod: LT

## 2021-04-23 PROCEDURE — 19371 PERI-IMPLT CAPSLC BRST COMPL: CPT | Mod: RT

## 2021-04-23 PROCEDURE — 19357 TISS XPNDR PLMT BRST RCNSTJ: CPT | Mod: LT

## 2021-04-23 PROCEDURE — 15734 MUSCLE-SKIN GRAFT TRUNK: CPT | Mod: RT,59

## 2021-04-23 PROCEDURE — 15777 ACELLULAR DERM MATRIX IMPLT: CPT | Mod: LT

## 2021-04-23 PROCEDURE — 19357 TISS XPNDR PLMT BRST RCNSTJ: CPT | Mod: RT

## 2021-04-23 PROCEDURE — 88300 SURGICAL PATH GROSS: CPT | Mod: 26,59

## 2021-04-23 PROCEDURE — 19357 TISS XPNDR PLMT BRST RCNSTJ: CPT | Mod: 50

## 2021-04-23 PROCEDURE — 19371 PERI-IMPLT CAPSLC BRST COMPL: CPT | Mod: 50

## 2021-04-23 PROCEDURE — 99261: CPT

## 2021-04-23 PROCEDURE — 88304 TISSUE EXAM BY PATHOLOGIST: CPT

## 2021-04-23 PROCEDURE — C1789: CPT

## 2021-04-23 RX ORDER — HYDROMORPHONE HYDROCHLORIDE 2 MG/ML
1 INJECTION INTRAMUSCULAR; INTRAVENOUS; SUBCUTANEOUS ONCE
Refills: 0 | Status: DISCONTINUED | OUTPATIENT
Start: 2021-04-23 | End: 2021-04-23

## 2021-04-23 RX ORDER — ONDANSETRON 8 MG/1
4 TABLET, FILM COATED ORAL ONCE
Refills: 0 | Status: DISCONTINUED | OUTPATIENT
Start: 2021-04-23 | End: 2021-04-23

## 2021-04-23 RX ORDER — SODIUM CHLORIDE 9 MG/ML
1000 INJECTION, SOLUTION INTRAVENOUS
Refills: 0 | Status: DISCONTINUED | OUTPATIENT
Start: 2021-04-23 | End: 2021-04-23

## 2021-04-23 RX ORDER — OXYCODONE HYDROCHLORIDE 5 MG/1
5 TABLET ORAL EVERY 6 HOURS
Refills: 0 | Status: DISCONTINUED | OUTPATIENT
Start: 2021-04-23 | End: 2021-04-23

## 2021-04-23 RX ORDER — HYDROMORPHONE HYDROCHLORIDE 2 MG/ML
0.5 INJECTION INTRAMUSCULAR; INTRAVENOUS; SUBCUTANEOUS ONCE
Refills: 0 | Status: DISCONTINUED | OUTPATIENT
Start: 2021-04-23 | End: 2021-04-23

## 2021-04-23 RX ADMIN — HYDROMORPHONE HYDROCHLORIDE 0.5 MILLIGRAM(S): 2 INJECTION INTRAMUSCULAR; INTRAVENOUS; SUBCUTANEOUS at 10:56

## 2021-04-23 RX ADMIN — HYDROMORPHONE HYDROCHLORIDE 0.5 MILLIGRAM(S): 2 INJECTION INTRAMUSCULAR; INTRAVENOUS; SUBCUTANEOUS at 11:22

## 2021-04-23 RX ADMIN — OXYCODONE HYDROCHLORIDE 5 MILLIGRAM(S): 5 TABLET ORAL at 13:00

## 2021-04-23 RX ADMIN — OXYCODONE HYDROCHLORIDE 5 MILLIGRAM(S): 5 TABLET ORAL at 12:28

## 2021-04-23 RX ADMIN — SODIUM CHLORIDE 50 MILLILITER(S): 9 INJECTION, SOLUTION INTRAVENOUS at 06:03

## 2021-04-23 NOTE — ASU DISCHARGE PLAN (ADULT/PEDIATRIC) - CARE PROVIDER_API CALL
Jin Shepherd; JADIEL)  Otolaryngology; Plastic Surgery  [address] [place] [address] [address]  [place] [address]  Phone: [phone]  Fax: [phone]  Follow Up Time:

## 2021-04-23 NOTE — PRE-ANESTHESIA EVALUATION ADULT - NSANTHADDINFOFT_GEN_ALL_CORE
Discussed GA with ETT in detail with patient and all questions sought and answered. Pt. agrees to all plans and wishes to proced with surgical care.

## 2021-04-23 NOTE — BRIEF OPERATIVE NOTE - OPERATION/FINDINGS
Revision of bilateral breast reconstruction with removal of subpectoral breast implants and placement of prepectoral tissue expanders (expanded with 250 ml saline) and Alloderm.

## 2021-04-23 NOTE — ASU DISCHARGE PLAN (ADULT/PEDIATRIC) - NURSING INSTRUCTIONS
All of discharge, safety, pain management, follow up with surgeon. Verbalized understanding of all instructions. All of discharge, safety, pain management, follow up with surgeon. Verbalized understanding of all instructions. Follow-up with Dr. Shepherd in the office on Monday 4/26/21 at 10:30 AM.

## 2021-04-23 NOTE — ASU PREOP CHECKLIST - IDENTIFICATION BAND VERIFIED
As certified below, I, or a nurse practitioner or physician assistant working with me, had a face-to-face encounter that meets the physician face-to-face encounter requirements. done

## 2021-04-23 NOTE — ASU PATIENT PROFILE, ADULT - PSH
Normal spontaneous vaginal delivery  ' 2015  Status post appendectomy  ' 2008:  laproscopic  Status post mastectomy  ' 2016:  Bilateral MRM / Right Axillary Lymph Node Dissection ( 1 node +), implant exchange - 2017

## 2021-04-26 ENCOUNTER — APPOINTMENT (OUTPATIENT)
Dept: PLASTIC SURGERY | Facility: CLINIC | Age: 36
End: 2021-04-26
Payer: COMMERCIAL

## 2021-04-26 VITALS
WEIGHT: 140 LBS | TEMPERATURE: 98 F | OXYGEN SATURATION: 100 % | DIASTOLIC BLOOD PRESSURE: 71 MMHG | HEIGHT: 63.6 IN | SYSTOLIC BLOOD PRESSURE: 108 MMHG | BODY MASS INDEX: 24.2 KG/M2 | HEART RATE: 67 BPM

## 2021-04-26 PROCEDURE — 99024 POSTOP FOLLOW-UP VISIT: CPT

## 2021-04-28 ENCOUNTER — APPOINTMENT (OUTPATIENT)
Dept: PLASTIC SURGERY | Facility: CLINIC | Age: 36
End: 2021-04-28

## 2021-04-30 ENCOUNTER — APPOINTMENT (OUTPATIENT)
Dept: PLASTIC SURGERY | Facility: CLINIC | Age: 36
End: 2021-04-30
Payer: COMMERCIAL

## 2021-04-30 VITALS — WEIGHT: 140 LBS | BODY MASS INDEX: 23.9 KG/M2 | HEIGHT: 64 IN | TEMPERATURE: 98.3 F

## 2021-04-30 LAB — SURGICAL PATHOLOGY STUDY: SIGNIFICANT CHANGE UP

## 2021-04-30 PROCEDURE — 99024 POSTOP FOLLOW-UP VISIT: CPT

## 2021-05-03 ENCOUNTER — APPOINTMENT (OUTPATIENT)
Dept: PLASTIC SURGERY | Facility: CLINIC | Age: 36
End: 2021-05-03
Payer: COMMERCIAL

## 2021-05-03 VITALS
HEART RATE: 60 BPM | OXYGEN SATURATION: 100 % | HEIGHT: 64 IN | TEMPERATURE: 98 F | WEIGHT: 140 LBS | DIASTOLIC BLOOD PRESSURE: 85 MMHG | SYSTOLIC BLOOD PRESSURE: 132 MMHG | BODY MASS INDEX: 23.9 KG/M2

## 2021-05-03 PROCEDURE — 99024 POSTOP FOLLOW-UP VISIT: CPT

## 2021-05-04 ENCOUNTER — APPOINTMENT (OUTPATIENT)
Dept: PLASTIC SURGERY | Facility: CLINIC | Age: 36
End: 2021-05-04
Payer: COMMERCIAL

## 2021-05-04 VITALS
BODY MASS INDEX: 23.05 KG/M2 | TEMPERATURE: 98.1 F | HEIGHT: 64 IN | SYSTOLIC BLOOD PRESSURE: 124 MMHG | WEIGHT: 135 LBS | DIASTOLIC BLOOD PRESSURE: 81 MMHG | OXYGEN SATURATION: 100 % | HEART RATE: 63 BPM

## 2021-05-04 PROCEDURE — 99024 POSTOP FOLLOW-UP VISIT: CPT

## 2021-05-06 ENCOUNTER — APPOINTMENT (OUTPATIENT)
Dept: PLASTIC SURGERY | Facility: CLINIC | Age: 36
End: 2021-05-06
Payer: COMMERCIAL

## 2021-05-06 VITALS
WEIGHT: 135 LBS | HEART RATE: 67 BPM | DIASTOLIC BLOOD PRESSURE: 78 MMHG | SYSTOLIC BLOOD PRESSURE: 117 MMHG | BODY MASS INDEX: 23.05 KG/M2 | OXYGEN SATURATION: 100 % | TEMPERATURE: 98.2 F | HEIGHT: 64 IN

## 2021-05-06 PROCEDURE — 99024 POSTOP FOLLOW-UP VISIT: CPT

## 2021-05-10 ENCOUNTER — APPOINTMENT (OUTPATIENT)
Dept: PLASTIC SURGERY | Facility: CLINIC | Age: 36
End: 2021-05-10
Payer: COMMERCIAL

## 2021-05-10 VITALS — TEMPERATURE: 97.2 F | BODY MASS INDEX: 23.05 KG/M2 | HEIGHT: 64 IN | WEIGHT: 135 LBS

## 2021-05-10 PROCEDURE — 99024 POSTOP FOLLOW-UP VISIT: CPT

## 2021-05-20 ENCOUNTER — APPOINTMENT (OUTPATIENT)
Dept: PLASTIC SURGERY | Facility: CLINIC | Age: 36
End: 2021-05-20
Payer: COMMERCIAL

## 2021-05-20 DIAGNOSIS — Z09 ENCOUNTER FOR FOLLOW-UP EXAMINATION AFTER COMPLETED TREATMENT FOR CONDITIONS OTHER THAN MALIGNANT NEOPLASM: ICD-10-CM

## 2021-05-20 PROCEDURE — 99024 POSTOP FOLLOW-UP VISIT: CPT

## 2021-05-20 NOTE — HISTORY OF PRESENT ILLNESS
[FreeTextEntry1] : Veronica is a 35 year old female who presents for a postop evaluation.  She is s/p revision of bilateral breast reconstruction with removal of bilateral subpectoral implants and placement of bilateral prepectoral tissue expanders 4/23/21.   She has been healing well and reports discomfort following her last TE fill, but is otherwise healing well.  She has slowly began exercising again and has tolerated it well.

## 2021-05-20 NOTE — PHYSICAL EXAM
[de-identified] : alert, calm, cooperative\par  [de-identified] : respirations are even and unlabored\par  [de-identified] : Bilateral breasts soft with tissue expanders in place, no mastectomy skin ischemia, no fluid collection, incisions clean/dry/intact without erythema\par Expansion:\par Before: 380 ml\par Today: +30 ml\par Total: 410 ml bilaterally

## 2021-06-17 ENCOUNTER — APPOINTMENT (OUTPATIENT)
Dept: PLASTIC SURGERY | Facility: CLINIC | Age: 36
End: 2021-06-17
Payer: COMMERCIAL

## 2021-06-17 VITALS — HEIGHT: 64 IN | WEIGHT: 135 LBS | TEMPERATURE: 98 F | BODY MASS INDEX: 23.05 KG/M2

## 2021-06-17 PROCEDURE — 99024 POSTOP FOLLOW-UP VISIT: CPT

## 2021-07-14 ENCOUNTER — APPOINTMENT (OUTPATIENT)
Dept: PLASTIC SURGERY | Facility: CLINIC | Age: 36
End: 2021-07-14
Payer: COMMERCIAL

## 2021-07-14 ENCOUNTER — OUTPATIENT (OUTPATIENT)
Dept: OUTPATIENT SERVICES | Facility: HOSPITAL | Age: 36
LOS: 1 days | End: 2021-07-14
Payer: COMMERCIAL

## 2021-07-14 VITALS
HEART RATE: 53 BPM | DIASTOLIC BLOOD PRESSURE: 85 MMHG | RESPIRATION RATE: 16 BRPM | HEIGHT: 63.5 IN | TEMPERATURE: 98 F | SYSTOLIC BLOOD PRESSURE: 130 MMHG | WEIGHT: 135.8 LBS | OXYGEN SATURATION: 100 %

## 2021-07-14 DIAGNOSIS — N65.0 DEFORMITY OF RECONSTRUCTED BREAST: ICD-10-CM

## 2021-07-14 DIAGNOSIS — N65.1 DISPROPORTION OF RECONSTRUCTED BREAST: ICD-10-CM

## 2021-07-14 DIAGNOSIS — Z42.1 ENCOUNTER FOR BREAST RECONSTRUCTION FOLLOWING MASTECTOMY: ICD-10-CM

## 2021-07-14 DIAGNOSIS — Z90.10 ACQUIRED ABSENCE OF UNSPECIFIED BREAST AND NIPPLE: Chronic | ICD-10-CM

## 2021-07-14 DIAGNOSIS — Z98.890 OTHER SPECIFIED POSTPROCEDURAL STATES: Chronic | ICD-10-CM

## 2021-07-14 DIAGNOSIS — Z01.818 ENCOUNTER FOR OTHER PREPROCEDURAL EXAMINATION: ICD-10-CM

## 2021-07-14 DIAGNOSIS — Z90.13 ACQUIRED ABSENCE OF BILATERAL BREASTS AND NIPPLES: ICD-10-CM

## 2021-07-14 DIAGNOSIS — Z90.49 ACQUIRED ABSENCE OF OTHER SPECIFIED PARTS OF DIGESTIVE TRACT: Chronic | ICD-10-CM

## 2021-07-14 DIAGNOSIS — Z85.3 PERSONAL HISTORY OF MALIGNANT NEOPLASM OF BREAST: ICD-10-CM

## 2021-07-14 LAB
HCT VFR BLD CALC: 34.5 % — SIGNIFICANT CHANGE UP (ref 34.5–45)
HGB BLD-MCNC: 11.2 G/DL — LOW (ref 11.5–15.5)
MCHC RBC-ENTMCNC: 30.3 PG — SIGNIFICANT CHANGE UP (ref 27–34)
MCHC RBC-ENTMCNC: 32.5 GM/DL — SIGNIFICANT CHANGE UP (ref 32–36)
MCV RBC AUTO: 93.2 FL — SIGNIFICANT CHANGE UP (ref 80–100)
NRBC # BLD: 0 /100 WBCS — SIGNIFICANT CHANGE UP (ref 0–0)
PLATELET # BLD AUTO: 244 K/UL — SIGNIFICANT CHANGE UP (ref 150–400)
RBC # BLD: 3.7 M/UL — LOW (ref 3.8–5.2)
RBC # FLD: 13.8 % — SIGNIFICANT CHANGE UP (ref 10.3–14.5)
WBC # BLD: 5.08 K/UL — SIGNIFICANT CHANGE UP (ref 3.8–10.5)
WBC # FLD AUTO: 5.08 K/UL — SIGNIFICANT CHANGE UP (ref 3.8–10.5)

## 2021-07-14 PROCEDURE — 85027 COMPLETE CBC AUTOMATED: CPT

## 2021-07-14 PROCEDURE — 99024 POSTOP FOLLOW-UP VISIT: CPT

## 2021-07-14 PROCEDURE — 36415 COLL VENOUS BLD VENIPUNCTURE: CPT

## 2021-07-14 PROCEDURE — G0463: CPT

## 2021-07-14 NOTE — H&P PST ADULT - TOBACCO USE
[Very Good] : ~his/her~  mood as very good [No falls in past year] : Patient reported no falls in the past year [Patient reported mammogram was normal] : Patient reported mammogram was normal [0] : 2) Feeling down, depressed, or hopeless: Not at all (0) [Patient reported colonoscopy was abnormal] : Patient reported colonoscopy was abnormal [MammogramDate] : 10/20 [ColonoscopyDate] : 10/20 [ColonoscopyComments] : repeat in 2 years Never smoker

## 2021-07-14 NOTE — H&P PST ADULT - HISTORY OF PRESENT ILLNESS
36yo female with pmhx of Right Breast Intraductal Carcinoma, 9/2016 surgically treated/ chemotherapy (completed 2017)  and Tamoxifen. Reports she had Revision of bilateral breast reconstruction  w/ tissue expanders by Dr. Shepherd 04/23/2021. Pt now returned for  Revision Bilateral Breast Reconstruction, Insertion of Implants scheduld for 7/28/21

## 2021-07-14 NOTE — H&P PST ADULT - NSICDXPROBLEM_GEN_ALL_CORE_FT
PROBLEM DIAGNOSES  Problem: Encounter for breast reconstruction following mastectomy  Assessment and Plan: Pre-op instructions given. Pt verbalized understanding  Chlorhexidine wash instructions given  UCG ordered STAT for day of procedure  Covid proof in chart

## 2021-07-14 NOTE — HISTORY OF PRESENT ILLNESS
[FreeTextEntry1] : Patient is s/p removal of subpectoral implants due to pain and placement of prepectoral tissue expanders on 4/23/21. She is here to review the plan for her next surgery.

## 2021-07-14 NOTE — H&P PST ADULT - NSANTHOSAYNRD_GEN_A_CORE
neck 12.75inches/No. AMANDA screening performed.  STOP BANG Legend: 0-2 = LOW Risk; 3-4 = INTERMEDIATE Risk; 5-8 = HIGH Risk

## 2021-07-14 NOTE — H&P PST ADULT - DOES PATIENT HAVE ADVANCE DIRECTIVE
Reason For Visit  GLEN LINCOLN is here today for a nurse visit for immunizations hep a and malaria prevention.      Allergies  No Known Drug Allergies  No Known Environmental Allergies  No Known Food Allergies    Nurse Documentation    traveling [place] [facility] - will be there for 14 days.           Assessment   1. Need for malaria prophylaxis (V07.8) (Z29.8)   2. Need for vaccination (V05.9) (Z23)    Plan   1. Atovaquone-Proguanil HCl - 250-100 MG Oral Tablet (Malarone); TAKE 1 TABLET   DAILY WITH FOOD. BEGIN 1-2 DAYS BEFORE TRAVEL, CONTINUE DURING STAY, AND   FOR 7 DAYS AFTER RETURN   2. Hepatitis A    Patient Instructions take medication as directed.      Signatures   Electronically signed by : LANE SMTIH, ; Aug  4 2017  2:52PM CST    
No

## 2021-07-14 NOTE — PHYSICAL EXAM
[de-identified] : Patient expanded to 440 (CPX4 - 450 expanders 12.7cm base width). Expanders in good position and patient happy with volume.

## 2021-07-27 ENCOUNTER — TRANSCRIPTION ENCOUNTER (OUTPATIENT)
Age: 36
End: 2021-07-27

## 2021-07-28 ENCOUNTER — RESULT REVIEW (OUTPATIENT)
Age: 36
End: 2021-07-28

## 2021-07-28 ENCOUNTER — APPOINTMENT (OUTPATIENT)
Dept: PLASTIC SURGERY | Facility: HOSPITAL | Age: 36
End: 2021-07-28
Payer: COMMERCIAL

## 2021-07-28 ENCOUNTER — OUTPATIENT (OUTPATIENT)
Dept: OUTPATIENT SERVICES | Facility: HOSPITAL | Age: 36
LOS: 1 days | End: 2021-07-28
Payer: COMMERCIAL

## 2021-07-28 VITALS
RESPIRATION RATE: 15 BRPM | OXYGEN SATURATION: 97 % | SYSTOLIC BLOOD PRESSURE: 106 MMHG | DIASTOLIC BLOOD PRESSURE: 66 MMHG | HEART RATE: 67 BPM

## 2021-07-28 VITALS
TEMPERATURE: 99 F | RESPIRATION RATE: 16 BRPM | WEIGHT: 135.8 LBS | SYSTOLIC BLOOD PRESSURE: 112 MMHG | OXYGEN SATURATION: 98 % | HEIGHT: 63.5 IN | HEART RATE: 58 BPM | DIASTOLIC BLOOD PRESSURE: 70 MMHG

## 2021-07-28 DIAGNOSIS — N65.0 DEFORMITY OF RECONSTRUCTED BREAST: ICD-10-CM

## 2021-07-28 DIAGNOSIS — Z90.10 ACQUIRED ABSENCE OF UNSPECIFIED BREAST AND NIPPLE: Chronic | ICD-10-CM

## 2021-07-28 DIAGNOSIS — Z98.890 OTHER SPECIFIED POSTPROCEDURAL STATES: Chronic | ICD-10-CM

## 2021-07-28 DIAGNOSIS — Z85.3 PERSONAL HISTORY OF MALIGNANT NEOPLASM OF BREAST: ICD-10-CM

## 2021-07-28 DIAGNOSIS — Z90.13 ACQUIRED ABSENCE OF BILATERAL BREASTS AND NIPPLES: ICD-10-CM

## 2021-07-28 DIAGNOSIS — Z90.49 ACQUIRED ABSENCE OF OTHER SPECIFIED PARTS OF DIGESTIVE TRACT: Chronic | ICD-10-CM

## 2021-07-28 DIAGNOSIS — Z42.1 ENCOUNTER FOR BREAST RECONSTRUCTION FOLLOWING MASTECTOMY: ICD-10-CM

## 2021-07-28 DIAGNOSIS — N65.1 DISPROPORTION OF RECONSTRUCTED BREAST: ICD-10-CM

## 2021-07-28 PROCEDURE — 15777 ACELLULAR DERM MATRIX IMPLT: CPT | Mod: RT

## 2021-07-28 PROCEDURE — 15777 ACELLULAR DERM MATRIX IMPLT: CPT | Mod: LT

## 2021-07-28 PROCEDURE — 88300 SURGICAL PATH GROSS: CPT

## 2021-07-28 PROCEDURE — 99261: CPT

## 2021-07-28 PROCEDURE — C1789: CPT

## 2021-07-28 PROCEDURE — 15777 ACELLULAR DERM MATRIX IMPLT: CPT

## 2021-07-28 PROCEDURE — 19342 INSJ/RPLCMT BRST IMPLT SEP D: CPT | Mod: LT

## 2021-07-28 PROCEDURE — 19342 INSJ/RPLCMT BRST IMPLT SEP D: CPT | Mod: RT

## 2021-07-28 PROCEDURE — 88300 SURGICAL PATH GROSS: CPT | Mod: 26

## 2021-07-28 PROCEDURE — 19380 REVJ RECONSTRUCTED BREAST: CPT | Mod: LT,59

## 2021-07-28 PROCEDURE — 19380 REVJ RECONSTRUCTED BREAST: CPT | Mod: RT,59

## 2021-07-28 PROCEDURE — 11970 RPLCMT TISS XPNDR PERM IMPLT: CPT | Mod: 50

## 2021-07-28 RX ORDER — HYDROMORPHONE HYDROCHLORIDE 2 MG/ML
0.5 INJECTION INTRAMUSCULAR; INTRAVENOUS; SUBCUTANEOUS
Refills: 0 | Status: DISCONTINUED | OUTPATIENT
Start: 2021-07-28 | End: 2021-07-28

## 2021-07-28 RX ORDER — ONDANSETRON 8 MG/1
4 TABLET, FILM COATED ORAL ONCE
Refills: 0 | Status: DISCONTINUED | OUTPATIENT
Start: 2021-07-28 | End: 2021-07-28

## 2021-07-28 RX ORDER — SODIUM CHLORIDE 9 MG/ML
1000 INJECTION, SOLUTION INTRAVENOUS
Refills: 0 | Status: DISCONTINUED | OUTPATIENT
Start: 2021-07-28 | End: 2021-08-11

## 2021-07-28 RX ORDER — SODIUM CHLORIDE 9 MG/ML
1000 INJECTION, SOLUTION INTRAVENOUS
Refills: 0 | Status: DISCONTINUED | OUTPATIENT
Start: 2021-07-28 | End: 2021-07-28

## 2021-07-28 RX ORDER — HYDROMORPHONE HYDROCHLORIDE 2 MG/ML
1 INJECTION INTRAMUSCULAR; INTRAVENOUS; SUBCUTANEOUS
Refills: 0 | Status: DISCONTINUED | OUTPATIENT
Start: 2021-07-28 | End: 2021-07-28

## 2021-07-28 RX ORDER — IBUPROFEN 200 MG
2 TABLET ORAL
Qty: 0 | Refills: 0 | DISCHARGE

## 2021-07-28 RX ADMIN — HYDROMORPHONE HYDROCHLORIDE 0.5 MILLIGRAM(S): 2 INJECTION INTRAMUSCULAR; INTRAVENOUS; SUBCUTANEOUS at 09:35

## 2021-07-28 RX ADMIN — SODIUM CHLORIDE 50 MILLILITER(S): 9 INJECTION, SOLUTION INTRAVENOUS at 06:05

## 2021-07-28 RX ADMIN — HYDROMORPHONE HYDROCHLORIDE 0.5 MILLIGRAM(S): 2 INJECTION INTRAMUSCULAR; INTRAVENOUS; SUBCUTANEOUS at 09:45

## 2021-07-28 NOTE — ASU DISCHARGE PLAN (ADULT/PEDIATRIC) - CARE PROVIDER_API CALL
Jin Shepherd; JADIEL)  Otolaryngology; Plastic Surgery  [address] [place] [address] [address]  [place] [address]  Phone: [phone]  Fax: [phone]  Follow Up Time:

## 2021-07-28 NOTE — ASU DISCHARGE PLAN (ADULT/PEDIATRIC) - NURSING INSTRUCTIONS
All discharge information reviewed with  patient including pain, safety, medication and follow up care. Pt instructed on care and draining of FARRUKH drains

## 2021-07-28 NOTE — ASU DISCHARGE PLAN (ADULT/PEDIATRIC) - PATIENT EDUCATION MATERIALS PROVIED
FARRUKH drain/Pre-printed instructions given for nerve block/Pre-printed instructions given for other (specify)

## 2021-07-28 NOTE — ASU PATIENT PROFILE, ADULT - PSH
H/O breast reconstruction  bilateral with expanders 4/2021  Normal spontaneous vaginal delivery  ' 2015  Status post appendectomy  ' 2008:  laproscopic  Status post mastectomy  ' 2016:  Bilateral MRM / Right Axillary Lymph Node Dissection ( 1 node +), implant exchange - 2017

## 2021-07-30 ENCOUNTER — APPOINTMENT (OUTPATIENT)
Dept: PLASTIC SURGERY | Facility: CLINIC | Age: 36
End: 2021-07-30
Payer: COMMERCIAL

## 2021-07-30 PROCEDURE — 99024 POSTOP FOLLOW-UP VISIT: CPT

## 2021-08-05 ENCOUNTER — APPOINTMENT (OUTPATIENT)
Dept: PLASTIC SURGERY | Facility: CLINIC | Age: 36
End: 2021-08-05
Payer: COMMERCIAL

## 2021-08-05 PROCEDURE — 99024 POSTOP FOLLOW-UP VISIT: CPT

## 2021-08-06 LAB — SURGICAL PATHOLOGY STUDY: SIGNIFICANT CHANGE UP

## 2021-08-09 ENCOUNTER — APPOINTMENT (OUTPATIENT)
Dept: PLASTIC SURGERY | Facility: CLINIC | Age: 36
End: 2021-08-09
Payer: COMMERCIAL

## 2021-08-09 VITALS
OXYGEN SATURATION: 100 % | HEIGHT: 63 IN | TEMPERATURE: 98 F | BODY MASS INDEX: 24.8 KG/M2 | DIASTOLIC BLOOD PRESSURE: 82 MMHG | WEIGHT: 140 LBS | SYSTOLIC BLOOD PRESSURE: 139 MMHG | HEART RATE: 68 BPM

## 2021-08-09 PROCEDURE — 99024 POSTOP FOLLOW-UP VISIT: CPT

## 2021-08-09 NOTE — REASON FOR VISIT
[Post Op: _________] : a [unfilled] post op visit [FreeTextEntry1] : s/p revision of bilateral breast reconstruction with exchange of prepectoral tissue expanders for silicone implants and ADM 7/28/21

## 2021-08-30 ENCOUNTER — APPOINTMENT (OUTPATIENT)
Dept: PLASTIC SURGERY | Facility: CLINIC | Age: 36
End: 2021-08-30
Payer: COMMERCIAL

## 2021-08-30 DIAGNOSIS — Z90.13 ACQUIRED ABSENCE OF BILATERAL BREASTS AND NIPPLES: ICD-10-CM

## 2021-08-30 PROCEDURE — 99024 POSTOP FOLLOW-UP VISIT: CPT

## 2021-08-31 PROBLEM — Z90.13 ABSENCE OF BREAST, ACQUIRED, BILATERAL: Status: ACTIVE | Noted: 2021-03-26

## 2021-09-17 ENCOUNTER — NON-APPOINTMENT (OUTPATIENT)
Age: 36
End: 2021-09-17

## 2021-09-17 ENCOUNTER — APPOINTMENT (OUTPATIENT)
Dept: INTERNAL MEDICINE | Facility: CLINIC | Age: 36
End: 2021-09-17
Payer: COMMERCIAL

## 2021-09-17 VITALS — BODY MASS INDEX: 23.97 KG/M2 | WEIGHT: 137 LBS | HEIGHT: 63.5 IN

## 2021-09-17 VITALS — HEART RATE: 72 BPM | DIASTOLIC BLOOD PRESSURE: 80 MMHG | SYSTOLIC BLOOD PRESSURE: 124 MMHG | RESPIRATION RATE: 14 BRPM

## 2021-09-17 DIAGNOSIS — U07.1 COVID-19: ICD-10-CM

## 2021-09-17 DIAGNOSIS — F41.9 ANXIETY DISORDER, UNSPECIFIED: ICD-10-CM

## 2021-09-17 DIAGNOSIS — Z00.00 ENCOUNTER FOR GENERAL ADULT MEDICAL EXAMINATION W/OUT ABNORMAL FINDINGS: ICD-10-CM

## 2021-09-17 DIAGNOSIS — M25.551 PAIN IN RIGHT HIP: ICD-10-CM

## 2021-09-17 DIAGNOSIS — Z23 ENCOUNTER FOR IMMUNIZATION: ICD-10-CM

## 2021-09-17 PROCEDURE — 36415 COLL VENOUS BLD VENIPUNCTURE: CPT

## 2021-09-17 PROCEDURE — 90686 IIV4 VACC NO PRSV 0.5 ML IM: CPT

## 2021-09-17 PROCEDURE — 99395 PREV VISIT EST AGE 18-39: CPT | Mod: 25

## 2021-09-17 PROCEDURE — G0008: CPT

## 2021-09-17 PROCEDURE — 93000 ELECTROCARDIOGRAM COMPLETE: CPT | Mod: 59

## 2021-09-17 PROCEDURE — G0444 DEPRESSION SCREEN ANNUAL: CPT | Mod: NC,59

## 2021-09-17 RX ORDER — OXYCODONE AND ACETAMINOPHEN 5; 325 MG/1; MG/1
5-325 TABLET ORAL
Qty: 20 | Refills: 0 | Status: DISCONTINUED | COMMUNITY
Start: 2021-07-26 | End: 2021-09-17

## 2021-09-17 RX ORDER — OXYCODONE AND ACETAMINOPHEN 5; 325 MG/1; MG/1
5-325 TABLET ORAL
Qty: 20 | Refills: 0 | Status: DISCONTINUED | COMMUNITY
Start: 2021-04-21 | End: 2021-09-17

## 2021-09-17 RX ORDER — CEFADROXIL 500 MG/1
500 CAPSULE ORAL
Qty: 14 | Refills: 0 | Status: DISCONTINUED | COMMUNITY
Start: 2021-07-26 | End: 2021-09-17

## 2021-09-17 RX ORDER — SULFAMETHOXAZOLE AND TRIMETHOPRIM 800; 160 MG/1; MG/1
800-160 TABLET ORAL
Qty: 14 | Refills: 0 | Status: DISCONTINUED | COMMUNITY
Start: 2021-04-21 | End: 2021-09-17

## 2021-09-17 RX ORDER — OXYCODONE AND ACETAMINOPHEN 5; 325 MG/1; MG/1
5-325 TABLET ORAL
Qty: 30 | Refills: 0 | Status: DISCONTINUED | COMMUNITY
Start: 2021-04-25 | End: 2021-09-17

## 2021-09-17 RX ORDER — IBUPROFEN 800 MG/1
800 TABLET, FILM COATED ORAL
Qty: 30 | Refills: 0 | Status: DISCONTINUED | COMMUNITY
Start: 2021-04-26 | End: 2021-09-17

## 2021-09-17 RX ORDER — IBUPROFEN 800 MG/1
800 TABLET, FILM COATED ORAL 3 TIMES DAILY
Qty: 20 | Refills: 0 | Status: DISCONTINUED | COMMUNITY
Start: 2021-07-26 | End: 2021-09-17

## 2021-09-17 RX ORDER — DIAZEPAM 5 MG/1
5 TABLET ORAL
Qty: 10 | Refills: 0 | Status: DISCONTINUED | COMMUNITY
Start: 2021-04-30 | End: 2021-09-17

## 2021-09-17 NOTE — HEALTH RISK ASSESSMENT
[Excellent] : ~his/her~  mood as  excellent [Yes] : Yes [No] : In the past 12 months have you used drugs other than those required for medical reasons? No [No falls in past year] : Patient reported no falls in the past year [0] : 2) Feeling down, depressed, or hopeless: Not at all (0) [PHQ-2 Negative - No further assessment needed] : PHQ-2 Negative - No further assessment needed [With Significant Other] : lives with significant other [Unemployed] : unemployed [] :  [# Of Children ___] : has [unfilled] children [Sexually Active] : sexually active [Feels Safe at Home] : Feels safe at home [Fully functional (bathing, dressing, toileting, transferring, walking, feeding)] : Fully functional (bathing, dressing, toileting, transferring, walking, feeding) [Fully functional (using the telephone, shopping, preparing meals, housekeeping, doing laundry, using] : Fully functional and needs no help or supervision to perform IADLs (using the telephone, shopping, preparing meals, housekeeping, doing laundry, using transportation, managing medications and managing finances) [Smoke Detector] : smoke detector [Carbon Monoxide Detector] : carbon monoxide detector [Seat Belt] :  uses seat belt [] : No [[id number]] : 0 [Change in mental status noted] : No change in mental status noted [Language] : denies difficulty with language [Reports changes in hearing] : Reports no changes in hearing [Reports changes in vision] : Reports no changes in vision [Reports changes in dental health] : Reports no changes in dental health [PapSmearDate] : 02/21

## 2021-09-17 NOTE — HISTORY OF PRESENT ILLNESS
[de-identified] : Pt presents for her annual physical. \par She had breast CA in 2016. Has regular f/u with oncology.\par Had breast revision this past summer\par No acute complaints except for bloating from Tamoxifen\par Admits to occasional anxiety due to motherhood/COVID/health\par Expecting third child next month (by surrogate)

## 2021-09-17 NOTE — PHYSICAL EXAM

## 2021-09-17 NOTE — ASSESSMENT
[FreeTextEntry1] : Blood work was drawn and sent to the lab today. The patient has been instructed to call the office next week to discuss today's lab work.\par \par Routine health counselling provided\par \par Xanax PRN\par Istop checked\par \par Onc f/u every 6 months\par GYN f/u\par Optho evaluation per routine\par \par Flu vaccine given\par \par Annual CC

## 2021-12-27 NOTE — PHYSICAL EXAM
[de-identified] : b/l breasts soft, nt. incision c/d/i and healing well. FARRUKH drains held to suction with SS output. implants with good volume and symmetry. no collection or evidence of infection.

## 2021-12-27 NOTE — HISTORY OF PRESENT ILLNESS
[FreeTextEntry1] : 35 y.o. F history of right breast CA diagnosed in 2016 (ER+, [place]+, HER2 -) s/p b/l mastectomies with right SLNBx (1+ micromets/6 lymph nodes), placement of b/l subpectoral tissue expanders s/p exchange for subpectoral silicone implants (mentor style III  smooth ultra high profile 535cc implant) in 2017. She presented to our practice seeking revision of her reconstruction; she underwent revision with exchange of b/l subpectoral implants, placement of pre pectoral TE placement ([place] CPX4-450 TE) and ADM on 21 and most recently underwent exchange of b/l breast Debby with placement of silicone implants () and ADM on 21. \par \par She is without any complaints and reports doing well. She denies fever, chills, redness, swelling, and pain. Drain outputs reviewed. \par \par Other significant history: Patient has 2 children, one  and one via surrogate. Genetic testing negative. \par \par

## 2022-01-11 NOTE — H&P PST ADULT - LAST CARDIAC ANGIOGRAM/IMAGING
Name of caller: Jose    Reason for Call:  Pt states his urine is kind of orange, states maybe looks like there is some blood in it.    Surgeon:  Dr. Randall    Recent Surgery:  Yes.    If yes, when & what type:  1/10/22, Laparoscopic cholecystectomy.      Best phone number to reach pt at is: [phone]  Ok to leave a message with medical info? Yes.    Pharmacy preferred (if calling for a refill): NA           denies

## 2022-01-19 ENCOUNTER — NON-APPOINTMENT (OUTPATIENT)
Age: 37
End: 2022-01-19

## 2022-01-20 ENCOUNTER — APPOINTMENT (OUTPATIENT)
Dept: ORTHOPEDIC SURGERY | Facility: CLINIC | Age: 37
End: 2022-01-20
Payer: COMMERCIAL

## 2022-01-20 VITALS
SYSTOLIC BLOOD PRESSURE: 127 MMHG | DIASTOLIC BLOOD PRESSURE: 81 MMHG | BODY MASS INDEX: 23.62 KG/M2 | WEIGHT: 135 LBS | HEART RATE: 69 BPM | HEIGHT: 63.5 IN

## 2022-01-20 DIAGNOSIS — M76.891 OTHER SPECIFIED ENTHESOPATHIES OF RIGHT LOWER LIMB, EXCLUDING FOOT: ICD-10-CM

## 2022-01-20 PROCEDURE — 99204 OFFICE O/P NEW MOD 45 MIN: CPT

## 2022-01-20 NOTE — PHYSICAL EXAM
[de-identified] : Constitutional: Well-nourished, well-developed, No acute distress\par Respiratory:  Good respiratory effort, no SOB\par Lymphatic: No regional lymphadenopathy, no lymphedema\par Psychiatric: Pleasant and normal affect, alert and oriented x3\par Musculoskeletal: normal except where as noted in regional exam\par \par Right hip:\par \par APPEARANCE: no marked deformities, no swelling or malalignment\par POSITIVE TENDERNESS: Hip flexor region, distal iliopsoas, proximal sartorius and rectus femoris\par NONTENDER: greater trochanter, TFL, gluteal region, ischium/proximal hamstring region, and pubic symphysis. \par ROM: full & painless. \par RESISTIVE TESTING: Mild pain with resisted hip flexion, more sore at 90/90 hip and knee flexion, Mild pain with resisted SLR, otherwise painless resisted extension, ER/IR/abduction/adduction. \par SPECIAL TESTS: neg SELIN,+  FADIR, painless loaded flexion & scouring\par PULSES: 2+ DP/PT pulses\par Neuro:  NL sensation of thigh and lower extremity\par

## 2022-01-20 NOTE — DISCUSSION/SUMMARY
[de-identified] : Discussed findings of today's exam and possible causes of patient's pain.  Educated patient on their most probable diagnosis of chronic atraumatic right hip pain with recent atraumatic exacerbation due to hip flexor tendinitis.  Reviewed possible courses of treatment, and we collaboratively decided best course of treatment at this time will include conservative management.  Patient likely has primary etiology of hip pain due to hip flexor tendinitis, but she may have some degenerative labral pathology as well.  Recommended trial of conservative management prior to x-ray or MRI imaging as patient does not have any apparent surgical indications.  Patient started on a course of oral NSAIDs, prescription given for Mobic (We discussed all possible side effects of this medication).  Patient will be started on a course of physical therapy to restore normal range of motion and strength as tolerated.  Patient is concerned as she has a history of breast cancer at a young age, she is advised that she has apparent musculoskeletal etiology of her pain, this is unlikely to be any type of cancer.  However, she does not have notable improvement or full resolution of her pain with the above conservative treatment measures would consider x-ray and/or MRI at that time to rule out either musculoskeletal/orthopedic etiology such as labral pathology, or some other abnormality which may include potential cancer/tumor.  I advised the patient is highly unlikely at this time that the etiology of her pain is a type of cancer.  Follow up as needed.  Patient appreciates and agrees with current plan.\par \par I work as part of an academic orthopedic group and routinely have a physician in training (resident / fellow) working with me.  Any part of the history and physical exam performed by the physician in training was either directly reviewed and/or replicated by myself.  Any procedure performed by the physician in training was performed under my direct supervision and with the consent of the patient.\par \par This note was generated using dragon medical dictation software.  A reasonable effort has been made for proofreading its contents, but typos may still remain.  If there are any questions or points of clarification needed please notify my office.\par

## 2022-01-27 ENCOUNTER — APPOINTMENT (OUTPATIENT)
Dept: INTERNAL MEDICINE | Facility: CLINIC | Age: 37
End: 2022-01-27
Payer: COMMERCIAL

## 2022-01-27 VITALS — BODY MASS INDEX: 30.67 KG/M2 | HEIGHT: 63.5 IN | WEIGHT: 175.25 LBS

## 2022-01-27 VITALS — HEIGHT: 63.5 IN | BODY MASS INDEX: 25.37 KG/M2 | WEIGHT: 145 LBS

## 2022-01-27 DIAGNOSIS — D50.9 IRON DEFICIENCY ANEMIA, UNSPECIFIED: ICD-10-CM

## 2022-01-27 DIAGNOSIS — R22.1 LOCALIZED SWELLING, MASS AND LUMP, NECK: ICD-10-CM

## 2022-01-27 LAB
25(OH)D3 SERPL-MCNC: 43.7 NG/ML
ALBUMIN SERPL ELPH-MCNC: 5 G/DL
ALP BLD-CCNC: 44 U/L
ALT SERPL-CCNC: 16 U/L
ANION GAP SERPL CALC-SCNC: 16 MMOL/L
APPEARANCE: CLEAR
AST SERPL-CCNC: 26 U/L
BASOPHILS # BLD AUTO: 0.07 K/UL
BASOPHILS NFR BLD AUTO: 1.3 %
BILIRUB SERPL-MCNC: 0.4 MG/DL
BILIRUBIN URINE: NEGATIVE
BLOOD URINE: NEGATIVE
BUN SERPL-MCNC: 9 MG/DL
CALCIUM SERPL-MCNC: 9.8 MG/DL
CHLORIDE SERPL-SCNC: 106 MMOL/L
CHOLEST SERPL-MCNC: 140 MG/DL
CO2 SERPL-SCNC: 21 MMOL/L
COLOR: NORMAL
CREAT SERPL-MCNC: 0.67 MG/DL
EOSINOPHIL # BLD AUTO: 0.14 K/UL
EOSINOPHIL NFR BLD AUTO: 2.6 %
FERRITIN SERPL-MCNC: 29 NG/ML
FOLATE SERPL-MCNC: >20 NG/ML
GLUCOSE QUALITATIVE U: NEGATIVE
GLUCOSE SERPL-MCNC: 83 MG/DL
HCT VFR BLD CALC: 36.3 %
HDLC SERPL-MCNC: 73 MG/DL
HGB BLD-MCNC: 11.1 G/DL
IMM GRANULOCYTES NFR BLD AUTO: 0.2 %
IRON SATN MFR SERPL: 15 %
IRON SERPL-MCNC: 47 UG/DL
KETONES URINE: NEGATIVE
LDLC SERPL CALC-MCNC: 54 MG/DL
LEUKOCYTE ESTERASE URINE: NEGATIVE
LYMPHOCYTES # BLD AUTO: 1.83 K/UL
LYMPHOCYTES NFR BLD AUTO: 34.6 %
MAGNESIUM SERPL-MCNC: 2.2 MG/DL
MAN DIFF?: NORMAL
MCHC RBC-ENTMCNC: 30.6 GM/DL
MCHC RBC-ENTMCNC: 30.9 PG
MCV RBC AUTO: 101.1 FL
MONOCYTES # BLD AUTO: 0.54 K/UL
MONOCYTES NFR BLD AUTO: 10.2 %
NEUTROPHILS # BLD AUTO: 2.7 K/UL
NEUTROPHILS NFR BLD AUTO: 51.1 %
NITRITE URINE: NEGATIVE
NONHDLC SERPL-MCNC: 68 MG/DL
PH URINE: 5
PLATELET # BLD AUTO: 265 K/UL
POTASSIUM SERPL-SCNC: 4.5 MMOL/L
PROT SERPL-MCNC: 7 G/DL
PROTEIN URINE: NEGATIVE
RBC # BLD: 3.59 M/UL
RBC # FLD: 13.2 %
SODIUM SERPL-SCNC: 143 MMOL/L
SPECIFIC GRAVITY URINE: 1.01
T4 FREE SERPL-MCNC: 1.2 NG/DL
T4 SERPL-MCNC: 6 UG/DL
TIBC SERPL-MCNC: 315 UG/DL
TRIGL SERPL-MCNC: 68 MG/DL
TSH SERPL-ACNC: 1.15 UIU/ML
UIBC SERPL-MCNC: 268 UG/DL
URATE SERPL-MCNC: 3.9 MG/DL
UROBILINOGEN URINE: NORMAL
VIT B12 SERPL-MCNC: 1109 PG/ML
WBC # FLD AUTO: 5.29 K/UL

## 2022-01-27 PROCEDURE — 36415 COLL VENOUS BLD VENIPUNCTURE: CPT

## 2022-01-27 PROCEDURE — 99000 SPECIMEN HANDLING OFFICE-LAB: CPT

## 2022-01-27 PROCEDURE — 99213 OFFICE O/P EST LOW 20 MIN: CPT | Mod: 25

## 2022-01-27 NOTE — ASSESSMENT
[FreeTextEntry1] : Blood work was drawn and sent to the lab today. The patient has been instructed to call the office next week to discuss today's lab work.\par \par for US neck\par Reassurance provided

## 2022-01-27 NOTE — HISTORY OF PRESENT ILLNESS
[FreeTextEntry8] : Pt presents for evaluation of "lump" on outside of left ankle.\par No trauma known.\par Not painful, red, or warm\par \par also thinks she  might have an enlarged LN on left side of neck\par No fever/chills\par No sore throat/dental pain/ nasal congestion

## 2022-01-27 NOTE — PHYSICAL EXAM
[Normal] : normal rate, regular rhythm, normal S1 and S2 and no murmur heard [de-identified] : palpable LN left anterior neck, mobile, does not appear enlarged, not tender [de-identified] : mobile cyst left lateral ankle

## 2022-01-28 LAB
BASOPHILS # BLD AUTO: 0.07 K/UL
BASOPHILS NFR BLD AUTO: 0.9 %
EOSINOPHIL # BLD AUTO: 0.14 K/UL
EOSINOPHIL NFR BLD AUTO: 1.9 %
ERYTHROCYTE [SEDIMENTATION RATE] IN BLOOD BY WESTERGREN METHOD: 5 MM/HR
FERRITIN SERPL-MCNC: 60 NG/ML
HCT VFR BLD CALC: 36.6 %
HGB BLD-MCNC: 11.7 G/DL
IMM GRANULOCYTES NFR BLD AUTO: 0.1 %
IRON SATN MFR SERPL: 33 %
IRON SERPL-MCNC: 113 UG/DL
LYMPHOCYTES # BLD AUTO: 2.23 K/UL
LYMPHOCYTES NFR BLD AUTO: 30.1 %
MAN DIFF?: NORMAL
MCHC RBC-ENTMCNC: 30.4 PG
MCHC RBC-ENTMCNC: 32 GM/DL
MCV RBC AUTO: 95.1 FL
MONOCYTES # BLD AUTO: 0.7 K/UL
MONOCYTES NFR BLD AUTO: 9.4 %
NEUTROPHILS # BLD AUTO: 4.26 K/UL
NEUTROPHILS NFR BLD AUTO: 57.6 %
PLATELET # BLD AUTO: 267 K/UL
RBC # BLD: 3.85 M/UL
RBC # FLD: 13.7 %
TIBC SERPL-MCNC: 340 UG/DL
UIBC SERPL-MCNC: 228 UG/DL
WBC # FLD AUTO: 7.41 K/UL

## 2022-01-31 ENCOUNTER — APPOINTMENT (OUTPATIENT)
Dept: ULTRASOUND IMAGING | Facility: CLINIC | Age: 37
End: 2022-01-31
Payer: COMMERCIAL

## 2022-01-31 ENCOUNTER — OUTPATIENT (OUTPATIENT)
Dept: OUTPATIENT SERVICES | Facility: HOSPITAL | Age: 37
LOS: 1 days | End: 2022-01-31
Payer: COMMERCIAL

## 2022-01-31 DIAGNOSIS — Z98.890 OTHER SPECIFIED POSTPROCEDURAL STATES: Chronic | ICD-10-CM

## 2022-01-31 DIAGNOSIS — R22.1 LOCALIZED SWELLING, MASS AND LUMP, NECK: ICD-10-CM

## 2022-01-31 DIAGNOSIS — Z90.10 ACQUIRED ABSENCE OF UNSPECIFIED BREAST AND NIPPLE: Chronic | ICD-10-CM

## 2022-01-31 DIAGNOSIS — Z90.49 ACQUIRED ABSENCE OF OTHER SPECIFIED PARTS OF DIGESTIVE TRACT: Chronic | ICD-10-CM

## 2022-01-31 PROCEDURE — 76536 US EXAM OF HEAD AND NECK: CPT | Mod: 26

## 2022-01-31 PROCEDURE — 76536 US EXAM OF HEAD AND NECK: CPT

## 2022-03-24 ENCOUNTER — APPOINTMENT (OUTPATIENT)
Dept: PLASTIC SURGERY | Facility: CLINIC | Age: 37
End: 2022-03-24
Payer: COMMERCIAL

## 2022-03-24 VITALS
TEMPERATURE: 97.8 F | WEIGHT: 130 LBS | DIASTOLIC BLOOD PRESSURE: 81 MMHG | SYSTOLIC BLOOD PRESSURE: 127 MMHG | HEART RATE: 65 BPM | BODY MASS INDEX: 23.04 KG/M2 | OXYGEN SATURATION: 100 % | HEIGHT: 63 IN

## 2022-03-24 DIAGNOSIS — N65.0 DEFORMITY OF RECONSTRUCTED BREAST: ICD-10-CM

## 2022-03-24 PROCEDURE — 99213 OFFICE O/P EST LOW 20 MIN: CPT

## 2022-04-11 PROBLEM — Z11.59 SCREENING FOR VIRAL DISEASE: Status: ACTIVE | Noted: 2020-07-30

## 2022-06-14 ENCOUNTER — RESULT REVIEW (OUTPATIENT)
Age: 37
End: 2022-06-14

## 2022-07-18 NOTE — H&P PST ADULT - NSICDXPASTSURGICALHX_GEN_ALL_CORE_FT
----- Message from Jonathan Davis MD sent at 7/18/2022  9:11 AM CDT -----  Please call. Fibroid uterus is still same size. But we were trying to check the left ovary - and it can't be seen . That usually means that it is small . On last US, says just minimally complicated cystic structure. Radiology just always recommends follow up in 2-3 months. I do not think that the minimally complicated cyst was anything to worry about. But we can either repeat US in the future or get MRI. When goes through [facility], fibroids will shrink. PAST SURGICAL HISTORY:  H/O breast reconstruction bilateral with expanders 4/2021    Normal spontaneous vaginal delivery ' 2015    Status post appendectomy ' 2008:  laproscopic    Status post mastectomy ' 2016:  Bilateral MRM / Right Axillary Lymph Node Dissection ( 1 node +), implant exchange - 2017

## 2022-09-12 ENCOUNTER — APPOINTMENT (OUTPATIENT)
Dept: MAMMOGRAPHY | Facility: CLINIC | Age: 37
End: 2022-09-12

## 2022-09-12 ENCOUNTER — OUTPATIENT (OUTPATIENT)
Dept: OUTPATIENT SERVICES | Facility: HOSPITAL | Age: 37
LOS: 1 days | End: 2022-09-12
Payer: COMMERCIAL

## 2022-09-12 ENCOUNTER — APPOINTMENT (OUTPATIENT)
Dept: ULTRASOUND IMAGING | Facility: CLINIC | Age: 37
End: 2022-09-12

## 2022-09-12 DIAGNOSIS — Z98.890 OTHER SPECIFIED POSTPROCEDURAL STATES: Chronic | ICD-10-CM

## 2022-09-12 DIAGNOSIS — Z90.49 ACQUIRED ABSENCE OF OTHER SPECIFIED PARTS OF DIGESTIVE TRACT: Chronic | ICD-10-CM

## 2022-09-12 DIAGNOSIS — Z90.10 ACQUIRED ABSENCE OF UNSPECIFIED BREAST AND NIPPLE: Chronic | ICD-10-CM

## 2022-09-12 DIAGNOSIS — Z00.8 ENCOUNTER FOR OTHER GENERAL EXAMINATION: ICD-10-CM

## 2022-09-12 PROCEDURE — 76642 ULTRASOUND BREAST LIMITED: CPT

## 2022-09-12 PROCEDURE — 76642 ULTRASOUND BREAST LIMITED: CPT | Mod: 26,RT

## 2022-09-20 ENCOUNTER — OUTPATIENT (OUTPATIENT)
Dept: OUTPATIENT SERVICES | Facility: HOSPITAL | Age: 37
LOS: 1 days | End: 2022-09-20
Payer: COMMERCIAL

## 2022-09-20 ENCOUNTER — APPOINTMENT (OUTPATIENT)
Dept: RADIOLOGY | Facility: CLINIC | Age: 37
End: 2022-09-20

## 2022-09-20 DIAGNOSIS — Z00.8 ENCOUNTER FOR OTHER GENERAL EXAMINATION: ICD-10-CM

## 2022-09-20 DIAGNOSIS — Z90.49 ACQUIRED ABSENCE OF OTHER SPECIFIED PARTS OF DIGESTIVE TRACT: Chronic | ICD-10-CM

## 2022-09-20 DIAGNOSIS — Z90.10 ACQUIRED ABSENCE OF UNSPECIFIED BREAST AND NIPPLE: Chronic | ICD-10-CM

## 2022-09-20 DIAGNOSIS — Z98.890 OTHER SPECIFIED POSTPROCEDURAL STATES: Chronic | ICD-10-CM

## 2022-09-20 PROCEDURE — 73030 X-RAY EXAM OF SHOULDER: CPT | Mod: 26,RT

## 2022-09-20 PROCEDURE — 73502 X-RAY EXAM HIP UNI 2-3 VIEWS: CPT

## 2022-09-20 PROCEDURE — 73030 X-RAY EXAM OF SHOULDER: CPT

## 2022-09-20 PROCEDURE — 73502 X-RAY EXAM HIP UNI 2-3 VIEWS: CPT | Mod: 26,LT

## 2022-10-17 ENCOUNTER — APPOINTMENT (OUTPATIENT)
Dept: OBGYN | Facility: CLINIC | Age: 37
End: 2022-10-17

## 2022-10-17 VITALS
BODY MASS INDEX: 24.8 KG/M2 | HEIGHT: 63 IN | WEIGHT: 140 LBS | DIASTOLIC BLOOD PRESSURE: 85 MMHG | SYSTOLIC BLOOD PRESSURE: 126 MMHG | HEART RATE: 63 BPM

## 2022-10-17 PROCEDURE — 99203 OFFICE O/P NEW LOW 30 MIN: CPT

## 2022-10-21 NOTE — PLAN
[FreeTextEntry1] : 37 yo  LMP 22 presents for consultation regarding hysterectomy. Fibroids. H/o breast CA. Dysmenorrhea/heavy period.\par \par Fibroid\par -Discussed the option of continued conservative observation of fibroids vs surgical removal. Treatment options of myomectomy, hysterectomy, ufe and continued observation were also outlined. A detailed discussion regarding the option of myomectomy vs hysterectomy was also held and the risks, benefits, and alternatives provided. All questions answered and pt to notify.\par -rx pelvic MRI\par -medical clearance\par -book TLH/BS, determine ovary involvement\par \par H/o breast CA\par -f/u w/ Dr. Zandra Rebolledo for ovary involvement in sx (and TXA risk)\par \par Dysmenorrhea/heavy periods\par -recc TXA

## 2022-10-21 NOTE — HISTORY OF PRESENT ILLNESS
[FreeTextEntry1] : 37 yo  LMP 22 presents for consultation regarding hysterectomy. Pt was referred by Dr. Tanya Henley, suggesting hysterectomy. Pelvic US from 2022 revealed 5.6 x 5.8 x 6.0 cm fibroid, grown from 4.5 x 4.6 x 4.3 cm on 2021 sono. Pt was diagnosed w/ Stage 1 breast CA, BRCA neg. Underwent mastectomy and chemotherapy. She also took tamoxifen for 5 yrs. Cancer managed by [facility] Dr. Zandra Rebolledo. Pt reports heavy, painful periods. Blood count had revealed slightly low iron, took iron pills twice weekly. Pt also had embryos frozen.\par \par OB: surrogate\par GYN: stage 1 breast CA (BRCA neg)

## 2022-10-21 NOTE — END OF VISIT
[FreeTextEntry3] : I, Iban Greco, acted as a scribe on behalf of Dr. Estrellita Jade on 10/17/2022 .\par \par All medical entries made by the scribe were at my, Dr. Estrellita Jade's, direction and personally dictated by me on 10/17/2022. I have reviewed the chart and agree that the record accurately reflects my personal performance of the history, physical exam, assessment and plan. I have also personally directed, reviewed, and agreed with the chart.

## 2022-10-25 PROBLEM — Z92.29 PERSONAL HISTORY OF TAMOXIFEN THERAPY: Status: ACTIVE | Noted: 2022-10-25

## 2022-10-25 PROBLEM — Z92.21 HISTORY OF CHEMOTHERAPY: Status: ACTIVE | Noted: 2022-10-25

## 2022-10-25 PROBLEM — N92.0 HEAVY MENSTRUAL BLEEDING: Status: ACTIVE | Noted: 2022-10-25

## 2022-10-26 ENCOUNTER — APPOINTMENT (OUTPATIENT)
Dept: GYNECOLOGIC ONCOLOGY | Facility: CLINIC | Age: 37
End: 2022-10-26

## 2022-10-26 VITALS
BODY MASS INDEX: 25.69 KG/M2 | SYSTOLIC BLOOD PRESSURE: 118 MMHG | WEIGHT: 145 LBS | HEART RATE: 54 BPM | HEIGHT: 63 IN | DIASTOLIC BLOOD PRESSURE: 81 MMHG

## 2022-10-26 DIAGNOSIS — Z92.29 PERSONAL HISTORY OF OTHER DRUG THERAPY: ICD-10-CM

## 2022-10-26 DIAGNOSIS — Z80.8 FAMILY HISTORY OF MALIGNANT NEOPLASM OF OTHER ORGANS OR SYSTEMS: ICD-10-CM

## 2022-10-26 DIAGNOSIS — Z80.1 FAMILY HISTORY OF MALIGNANT NEOPLASM OF TRACHEA, BRONCHUS AND LUNG: ICD-10-CM

## 2022-10-26 DIAGNOSIS — Z92.21 PERSONAL HISTORY OF ANTINEOPLASTIC CHEMOTHERAPY: ICD-10-CM

## 2022-10-26 DIAGNOSIS — Z76.89 PERSONS ENCOUNTERING HEALTH SERVICES IN OTHER SPECIFIED CIRCUMSTANCES: ICD-10-CM

## 2022-10-26 DIAGNOSIS — N92.0 EXCESSIVE AND FREQUENT MENSTRUATION WITH REGULAR CYCLE: ICD-10-CM

## 2022-10-26 PROCEDURE — 99204 OFFICE O/P NEW MOD 45 MIN: CPT

## 2022-11-08 ENCOUNTER — NON-APPOINTMENT (OUTPATIENT)
Age: 37
End: 2022-11-08

## 2022-11-09 ENCOUNTER — NON-APPOINTMENT (OUTPATIENT)
Age: 37
End: 2022-11-09

## 2022-11-17 ENCOUNTER — OUTPATIENT (OUTPATIENT)
Dept: OUTPATIENT SERVICES | Facility: HOSPITAL | Age: 37
LOS: 1 days | End: 2022-11-17

## 2022-11-17 ENCOUNTER — NON-APPOINTMENT (OUTPATIENT)
Age: 37
End: 2022-11-17

## 2022-11-17 VITALS
DIASTOLIC BLOOD PRESSURE: 82 MMHG | HEART RATE: 64 BPM | OXYGEN SATURATION: 98 % | WEIGHT: 145.06 LBS | RESPIRATION RATE: 16 BRPM | SYSTOLIC BLOOD PRESSURE: 124 MMHG | TEMPERATURE: 98 F | HEIGHT: 62 IN

## 2022-11-17 DIAGNOSIS — D25.9 LEIOMYOMA OF UTERUS, UNSPECIFIED: ICD-10-CM

## 2022-11-17 DIAGNOSIS — Z98.890 OTHER SPECIFIED POSTPROCEDURAL STATES: Chronic | ICD-10-CM

## 2022-11-17 DIAGNOSIS — Z90.10 ACQUIRED ABSENCE OF UNSPECIFIED BREAST AND NIPPLE: Chronic | ICD-10-CM

## 2022-11-17 DIAGNOSIS — Z01.812 ENCOUNTER FOR PREPROCEDURAL LABORATORY EXAMINATION: ICD-10-CM

## 2022-11-17 DIAGNOSIS — Z90.49 ACQUIRED ABSENCE OF OTHER SPECIFIED PARTS OF DIGESTIVE TRACT: Chronic | ICD-10-CM

## 2022-11-17 LAB
ANION GAP SERPL CALC-SCNC: 10 MMOL/L — SIGNIFICANT CHANGE UP (ref 7–14)
BLD GP AB SCN SERPL QL: NEGATIVE — SIGNIFICANT CHANGE UP
BUN SERPL-MCNC: 9 MG/DL — SIGNIFICANT CHANGE UP (ref 7–23)
CALCIUM SERPL-MCNC: 9.7 MG/DL — SIGNIFICANT CHANGE UP (ref 8.4–10.5)
CHLORIDE SERPL-SCNC: 101 MMOL/L — SIGNIFICANT CHANGE UP (ref 98–107)
CO2 SERPL-SCNC: 27 MMOL/L — SIGNIFICANT CHANGE UP (ref 22–31)
CREAT SERPL-MCNC: 0.64 MG/DL — SIGNIFICANT CHANGE UP (ref 0.5–1.3)
EGFR: 116 ML/MIN/1.73M2 — SIGNIFICANT CHANGE UP
GLUCOSE SERPL-MCNC: 91 MG/DL — SIGNIFICANT CHANGE UP (ref 70–99)
HCG UR QL: NEGATIVE — SIGNIFICANT CHANGE UP
HCT VFR BLD CALC: 35.2 % — SIGNIFICANT CHANGE UP (ref 34.5–45)
HGB BLD-MCNC: 11.3 G/DL — LOW (ref 11.5–15.5)
MCHC RBC-ENTMCNC: 29.4 PG — SIGNIFICANT CHANGE UP (ref 27–34)
MCHC RBC-ENTMCNC: 32.1 GM/DL — SIGNIFICANT CHANGE UP (ref 32–36)
MCV RBC AUTO: 91.7 FL — SIGNIFICANT CHANGE UP (ref 80–100)
NRBC # BLD: 0 /100 WBCS — SIGNIFICANT CHANGE UP (ref 0–0)
NRBC # FLD: 0 K/UL — SIGNIFICANT CHANGE UP (ref 0–0)
PLATELET # BLD AUTO: 322 K/UL — SIGNIFICANT CHANGE UP (ref 150–400)
POTASSIUM SERPL-MCNC: 3.9 MMOL/L — SIGNIFICANT CHANGE UP (ref 3.5–5.3)
POTASSIUM SERPL-SCNC: 3.9 MMOL/L — SIGNIFICANT CHANGE UP (ref 3.5–5.3)
RBC # BLD: 3.84 M/UL — SIGNIFICANT CHANGE UP (ref 3.8–5.2)
RBC # FLD: 13 % — SIGNIFICANT CHANGE UP (ref 10.3–14.5)
RH IG SCN BLD-IMP: POSITIVE — SIGNIFICANT CHANGE UP
SODIUM SERPL-SCNC: 138 MMOL/L — SIGNIFICANT CHANGE UP (ref 135–145)
WBC # BLD: 6.22 K/UL — SIGNIFICANT CHANGE UP (ref 3.8–10.5)
WBC # FLD AUTO: 6.22 K/UL — SIGNIFICANT CHANGE UP (ref 3.8–10.5)

## 2022-11-17 RX ORDER — TAMOXIFEN CITRATE 20 MG/1
1 TABLET, FILM COATED ORAL
Qty: 0 | Refills: 0 | DISCHARGE

## 2022-11-17 RX ORDER — L.ACIDOPH/B.ANIMALIS/B.LONGUM 15B CELL
1 CAPSULE ORAL
Qty: 0 | Refills: 0 | DISCHARGE

## 2022-11-17 NOTE — H&P PST ADULT - HISTORY OF PRESENT ILLNESS
38y/o female presents for preop eval for scheduled robotic assisted total laparoscopic hysterectomy, b/l salpingectomy.  Pt with h/o uterine fibroids.  C/o excessive menstruation.  Recent imaging show increased growth.

## 2022-11-17 NOTE — H&P PST ADULT - PROBLEM SELECTOR PLAN 1
Scheduled for robotic assisted total laparoscopic hysterectomy, b/l salpingectomy o12/1/22  Written & verbal preop instructions, gi prophylaxis & surgical soap given  Pt verbalized good understanding.  Teach back done on surgical soap instructions.

## 2022-11-17 NOTE — H&P PST ADULT - NSICDXPASTSURGICALHX_GEN_ALL_CORE_FT
PAST SURGICAL HISTORY:  H/O breast reconstruction bilateral with expanders 4/2021    Normal spontaneous vaginal delivery ' 2015    Status post appendectomy ' 2008:  laproscopic    Status post mastectomy ' 2016:  Bilateral MRM / Right Axillary Lymph Node Dissection ( 1 node +), implant exchange - 2017

## 2022-11-17 NOTE — H&P PST ADULT - NSICDXPASTMEDICALHX_GEN_ALL_CORE_FT
PAST MEDICAL HISTORY:  COVID-19 1/2021 - mild case    Intraductal carcinoma of right breast dx: 9/2016, & chemotherapy    Leiomyoma of uterus

## 2022-11-28 LAB — SARS-COV-2 RNA SPEC QL NAA+PROBE: SIGNIFICANT CHANGE UP

## 2022-11-30 ENCOUNTER — TRANSCRIPTION ENCOUNTER (OUTPATIENT)
Age: 37
End: 2022-11-30

## 2022-11-30 NOTE — ASU PATIENT PROFILE, ADULT - CAREGIVER PHONE NUMBER
MI received a referral from the medical team requesting assistance with discharge planning of group home pt.  patient is cleared and is able to return to their previous residence, [facility].  has spoken to Jayy Marcano  (Mental health therapy aide)  P#, [phone], at [facility].  confirmed that patient's mode of transportation is  Taxi .Clinical provider is in agreement with Taxi back to group home. Verbal huddle regarding coordination of care completed with interdisciplinary team.    MI scheduled [employer] via [employer] [facility] ([phone]) [facility] Inv#[id number] [phone]

## 2022-12-01 ENCOUNTER — TRANSCRIPTION ENCOUNTER (OUTPATIENT)
Age: 37
End: 2022-12-01

## 2022-12-01 ENCOUNTER — RESULT REVIEW (OUTPATIENT)
Age: 37
End: 2022-12-01

## 2022-12-01 ENCOUNTER — APPOINTMENT (OUTPATIENT)
Dept: GYNECOLOGIC ONCOLOGY | Facility: HOSPITAL | Age: 37
End: 2022-12-01

## 2022-12-01 ENCOUNTER — OUTPATIENT (OUTPATIENT)
Dept: OUTPATIENT SERVICES | Facility: HOSPITAL | Age: 37
LOS: 1 days | Discharge: HOME | End: 2022-12-01
Payer: COMMERCIAL

## 2022-12-01 VITALS
TEMPERATURE: 98 F | SYSTOLIC BLOOD PRESSURE: 118 MMHG | DIASTOLIC BLOOD PRESSURE: 80 MMHG | HEART RATE: 78 BPM | HEIGHT: 62 IN | OXYGEN SATURATION: 98 % | RESPIRATION RATE: 18 BRPM | WEIGHT: 145.06 LBS

## 2022-12-01 VITALS
RESPIRATION RATE: 16 BRPM | DIASTOLIC BLOOD PRESSURE: 55 MMHG | OXYGEN SATURATION: 100 % | SYSTOLIC BLOOD PRESSURE: 98 MMHG | HEART RATE: 72 BPM

## 2022-12-01 DIAGNOSIS — Z98.890 OTHER SPECIFIED POSTPROCEDURAL STATES: Chronic | ICD-10-CM

## 2022-12-01 DIAGNOSIS — Z90.10 ACQUIRED ABSENCE OF UNSPECIFIED BREAST AND NIPPLE: Chronic | ICD-10-CM

## 2022-12-01 DIAGNOSIS — D25.9 LEIOMYOMA OF UTERUS, UNSPECIFIED: ICD-10-CM

## 2022-12-01 DIAGNOSIS — Z90.49 ACQUIRED ABSENCE OF OTHER SPECIFIED PARTS OF DIGESTIVE TRACT: Chronic | ICD-10-CM

## 2022-12-01 LAB
ANION GAP SERPL CALC-SCNC: 11 MMOL/L — SIGNIFICANT CHANGE UP (ref 7–14)
BASOPHILS # BLD AUTO: 0.05 K/UL — SIGNIFICANT CHANGE UP (ref 0–0.2)
BASOPHILS NFR BLD AUTO: 0.4 % — SIGNIFICANT CHANGE UP (ref 0–2)
BUN SERPL-MCNC: 11 MG/DL — SIGNIFICANT CHANGE UP (ref 7–23)
CALCIUM SERPL-MCNC: 8.3 MG/DL — LOW (ref 8.4–10.5)
CHLORIDE SERPL-SCNC: 106 MMOL/L — SIGNIFICANT CHANGE UP (ref 98–107)
CO2 SERPL-SCNC: 21 MMOL/L — LOW (ref 22–31)
CREAT SERPL-MCNC: 0.54 MG/DL — SIGNIFICANT CHANGE UP (ref 0.5–1.3)
EGFR: 121 ML/MIN/1.73M2 — SIGNIFICANT CHANGE UP
EOSINOPHIL # BLD AUTO: 0 K/UL — SIGNIFICANT CHANGE UP (ref 0–0.5)
EOSINOPHIL NFR BLD AUTO: 0 % — SIGNIFICANT CHANGE UP (ref 0–6)
GLUCOSE SERPL-MCNC: 126 MG/DL — HIGH (ref 70–99)
HCG UR QL: NEGATIVE — SIGNIFICANT CHANGE UP
HCT VFR BLD CALC: 31.9 % — LOW (ref 34.5–45)
HGB BLD-MCNC: 10.2 G/DL — LOW (ref 11.5–15.5)
IANC: 12.06 K/UL — HIGH (ref 1.8–7.4)
IMM GRANULOCYTES NFR BLD AUTO: 0.3 % — SIGNIFICANT CHANGE UP (ref 0–0.9)
LYMPHOCYTES # BLD AUTO: 0.44 K/UL — LOW (ref 1–3.3)
LYMPHOCYTES # BLD AUTO: 3.5 % — LOW (ref 13–44)
MAGNESIUM SERPL-MCNC: 1.5 MG/DL — LOW (ref 1.6–2.6)
MCHC RBC-ENTMCNC: 29.6 PG — SIGNIFICANT CHANGE UP (ref 27–34)
MCHC RBC-ENTMCNC: 32 GM/DL — SIGNIFICANT CHANGE UP (ref 32–36)
MCV RBC AUTO: 92.5 FL — SIGNIFICANT CHANGE UP (ref 80–100)
MONOCYTES # BLD AUTO: 0.15 K/UL — SIGNIFICANT CHANGE UP (ref 0–0.9)
MONOCYTES NFR BLD AUTO: 1.2 % — LOW (ref 2–14)
NEUTROPHILS # BLD AUTO: 12.06 K/UL — HIGH (ref 1.8–7.4)
NEUTROPHILS NFR BLD AUTO: 94.6 % — HIGH (ref 43–77)
NRBC # BLD: 0 /100 WBCS — SIGNIFICANT CHANGE UP (ref 0–0)
NRBC # FLD: 0 K/UL — SIGNIFICANT CHANGE UP (ref 0–0)
PHOSPHATE SERPL-MCNC: 3.6 MG/DL — SIGNIFICANT CHANGE UP (ref 2.5–4.5)
PLATELET # BLD AUTO: 265 K/UL — SIGNIFICANT CHANGE UP (ref 150–400)
POTASSIUM SERPL-MCNC: 4 MMOL/L — SIGNIFICANT CHANGE UP (ref 3.5–5.3)
POTASSIUM SERPL-SCNC: 4 MMOL/L — SIGNIFICANT CHANGE UP (ref 3.5–5.3)
RBC # BLD: 3.45 M/UL — LOW (ref 3.8–5.2)
RBC # FLD: 12.9 % — SIGNIFICANT CHANGE UP (ref 10.3–14.5)
SODIUM SERPL-SCNC: 138 MMOL/L — SIGNIFICANT CHANGE UP (ref 135–145)
WBC # BLD: 12.74 K/UL — HIGH (ref 3.8–10.5)
WBC # FLD AUTO: 12.74 K/UL — HIGH (ref 3.8–10.5)

## 2022-12-01 PROCEDURE — 88331 PATH CONSLTJ SURG 1 BLK 1SPC: CPT | Mod: 26

## 2022-12-01 PROCEDURE — S2900 ROBOTIC SURGICAL SYSTEM: CPT | Mod: NC

## 2022-12-01 PROCEDURE — 58573 TLH W/T/O UTERUS OVER 250 G: CPT

## 2022-12-01 PROCEDURE — 88307 TISSUE EXAM BY PATHOLOGIST: CPT | Mod: 26

## 2022-12-01 RX ORDER — IBUPROFEN 200 MG
2 TABLET ORAL
Qty: 0 | Refills: 0 | DISCHARGE

## 2022-12-01 RX ORDER — HYDROMORPHONE HYDROCHLORIDE 2 MG/ML
0.5 INJECTION INTRAMUSCULAR; INTRAVENOUS; SUBCUTANEOUS
Refills: 0 | Status: DISCONTINUED | OUTPATIENT
Start: 2022-12-01 | End: 2022-12-01

## 2022-12-01 RX ORDER — ACETAMINOPHEN 500 MG
975 TABLET ORAL EVERY 6 HOURS
Refills: 0 | Status: DISCONTINUED | OUTPATIENT
Start: 2022-12-01 | End: 2022-12-15

## 2022-12-01 RX ORDER — IBUPROFEN 200 MG
1 TABLET ORAL
Qty: 0 | Refills: 0 | DISCHARGE

## 2022-12-01 RX ORDER — ONDANSETRON 8 MG/1
4 TABLET, FILM COATED ORAL ONCE
Refills: 0 | Status: DISCONTINUED | OUTPATIENT
Start: 2022-12-01 | End: 2022-12-15

## 2022-12-01 RX ORDER — OXYCODONE HYDROCHLORIDE 5 MG/1
5 TABLET ORAL ONCE
Refills: 0 | Status: DISCONTINUED | OUTPATIENT
Start: 2022-12-01 | End: 2022-12-01

## 2022-12-01 RX ORDER — CHOLECALCIFEROL (VITAMIN D3) 125 MCG
1 CAPSULE ORAL
Qty: 0 | Refills: 0 | DISCHARGE

## 2022-12-01 RX ORDER — SODIUM CHLORIDE 9 MG/ML
1000 INJECTION, SOLUTION INTRAVENOUS
Refills: 0 | Status: DISCONTINUED | OUTPATIENT
Start: 2022-12-01 | End: 2022-12-15

## 2022-12-01 RX ORDER — ACETAMINOPHEN 500 MG
3 TABLET ORAL
Qty: 0 | Refills: 0 | DISCHARGE

## 2022-12-01 RX ORDER — TRANEXAMIC ACID 100 MG/ML
2 INJECTION, SOLUTION INTRAVENOUS
Qty: 0 | Refills: 0 | DISCHARGE

## 2022-12-01 RX ORDER — IBUPROFEN 200 MG
600 TABLET ORAL EVERY 6 HOURS
Refills: 0 | Status: DISCONTINUED | OUTPATIENT
Start: 2022-12-01 | End: 2022-12-15

## 2022-12-01 RX ORDER — ASCORBIC ACID 60 MG
1 TABLET,CHEWABLE ORAL
Qty: 0 | Refills: 0 | DISCHARGE

## 2022-12-01 RX ORDER — TRAMADOL HYDROCHLORIDE 50 MG/1
1 TABLET ORAL
Qty: 12 | Refills: 0
Start: 2022-12-01

## 2022-12-01 RX ADMIN — HYDROMORPHONE HYDROCHLORIDE 0.5 MILLIGRAM(S): 2 INJECTION INTRAMUSCULAR; INTRAVENOUS; SUBCUTANEOUS at 18:00

## 2022-12-01 RX ADMIN — HYDROMORPHONE HYDROCHLORIDE 0.5 MILLIGRAM(S): 2 INJECTION INTRAMUSCULAR; INTRAVENOUS; SUBCUTANEOUS at 17:40

## 2022-12-01 RX ADMIN — HYDROMORPHONE HYDROCHLORIDE 0.5 MILLIGRAM(S): 2 INJECTION INTRAMUSCULAR; INTRAVENOUS; SUBCUTANEOUS at 17:15

## 2022-12-01 RX ADMIN — OXYCODONE HYDROCHLORIDE 5 MILLIGRAM(S): 5 TABLET ORAL at 17:44

## 2022-12-01 RX ADMIN — SODIUM CHLORIDE 100 MILLILITER(S): 9 INJECTION, SOLUTION INTRAVENOUS at 17:16

## 2022-12-01 RX ADMIN — HYDROMORPHONE HYDROCHLORIDE 0.5 MILLIGRAM(S): 2 INJECTION INTRAMUSCULAR; INTRAVENOUS; SUBCUTANEOUS at 17:44

## 2022-12-01 NOTE — ASU DISCHARGE PLAN (ADULT/PEDIATRIC) - CARE PROVIDER_API CALL
Fidelia Barlow)  Gynecologic Oncology; Obstetrics and Gynecology  [address] [place] [address]  Phone: [phone]  Fax: [phone]  Scheduled Appointment: 12/20/2022 09:00 AM

## 2022-12-01 NOTE — ASU DISCHARGE PLAN (ADULT/PEDIATRIC) - FOLLOW UP APPOINTMENTS
681 may also call Recovery Room (PACU) 24/7 @ [phone]/[facility]

## 2022-12-01 NOTE — BRIEF OPERATIVE NOTE - NSICDXBRIEFPROCEDURE_GEN_ALL_CORE_FT
PROCEDURES:  Robot-assisted laparoscopic total hysterectomy for uterus greater than 250 grams 01-Dec-2022 16:36:46  Diana Mancia  Robot-assisted bilateral salpingectomy 01-Dec-2022 16:36:57  Diana Mancia

## 2022-12-01 NOTE — ASU DISCHARGE PLAN (ADULT/PEDIATRIC) - NS MD DC FALL RISK RISK
For information on Fall & Injury Prevention, visit: [email or web address] OR  [email or web address] OR  https://www.cdc.gov/steadi/patient.html

## 2022-12-01 NOTE — BRIEF OPERATIVE NOTE - NSICDXBRIEFPREOP_GEN_ALL_CORE_FT
PRE-OP DIAGNOSIS:  Abnormal uterine bleeding (AUB) 01-Dec-2022 16:37:08  Diana Mancia  Fibroid uterus 01-Dec-2022 16:37:21  Diana Mancia

## 2022-12-01 NOTE — BRIEF OPERATIVE NOTE - NSICDXBRIEFPOSTOP_GEN_ALL_CORE_FT
POST-OP DIAGNOSIS:  Abnormal uterine bleeding (AUB) 01-Dec-2022 16:37:36  Diana Mancia  Fibroid uterus 01-Dec-2022 16:38:01  Diana Mancia

## 2022-12-01 NOTE — CHART NOTE - NSCHARTNOTEFT_GEN_A_CORE
PA GynOnc Post Op Note    Pt seen and examined at bedside in PACU resting comfortably.  Pt denies fever, chills, chest pain, SOB, nausea, vomiting, lightheadedness, dizziness. Pt having some discomfort at the lower segment of abdomen. Heat packs in place    T(C): 36.9 (12-01-22 @ 16:25), Max: 36.9 (12-01-22 @ 16:25)  HR: 73 (12-01-22 @ 17:00) (60 - 80)  BP: 102/56 (12-01-22 @ 17:00) (96/44 - 118/80)  RR: 13 (12-01-22 @ 17:00) (12 - 19)  SpO2: 99% (12-01-22 @ 17:00) (97% - 99%)    I&O's Detail    Physical Exam:  Constitutional: WDWN female, NAD AxOx3  Skin: warm and dry, no breakdowns noted  Chest: s1s2+, RRR, clear to auscultation bilaterally, no w/r/r    Abdomen: soft, nondistended, no guarding, no rebound, + bowel sounds,  Appropriate tenderness noted   Incisional site: 4 scope sites all clean and dry with outer dressings intact.   Extremities: no lower extremity edema or calf tenderness bilaterally    LABS:  to be drawn at 6p    a/p: This 37y female, s/p Robotic Assisted laparoscopic hysterectomy, bilateral salpingectomy, Frozen->benign, EBL: 50cc, pt stable    CV: hemodynamically stable  PUL: adequate on RA  GI: tolerating sips of fluid  : pt is DTV by 12midnight  ID: afebrile, WBC stable, labs as above  DVT prophylaxis: SQ Heparin intraop  Pain Management: controlled presently  continue IV Fluids LR@125cc/hr  Patient is cleared for discharge by Gyn Onc after she meets all the PACU post operative criteria (voiding without dysuria, able to tolerate PO meds and food, and able to ambulate without assistance) Prescriptions sent electronically to patient's pharmacy of choice.   Patient has follow up appointment in 2 weeks, which has been documented in the discharge report papers given to patient.  d/w gyn onc team    Marlene Heard, PAC  #[id number] spectra

## 2022-12-01 NOTE — BRIEF OPERATIVE NOTE - OPERATION/FINDINGS
EUA: NEFG, mobile enlarged uterus w/posterior fibroid, nonpalpable adnexa b/l, smooth RV septum  LSC: hemostatic atraumatic entry site, normal upper abdominal survey including liver edge, falciform, stomach edge, omentum; pelvic survey w/enlarged fibroid uterus, dominant posterior fibroid, (frozen = benign), normal adnexa b/l, clear cul-de-sac, smooth peritoneal surfaces

## 2022-12-01 NOTE — ASU DISCHARGE PLAN (ADULT/PEDIATRIC) - MEDICATION INSTRUCTIONS
Tramadol 50mg every 6 hours for breakthrough pain as needed. Otherwise Tylenol 975mg every 6 hours and Motrin 600mg every 6 hours

## 2022-12-02 LAB — GLUCOSE BLDC GLUCOMTR-MCNC: 83 MG/DL — SIGNIFICANT CHANGE UP (ref 70–99)

## 2022-12-08 LAB — SURGICAL PATHOLOGY STUDY: SIGNIFICANT CHANGE UP

## 2022-12-13 PROBLEM — D25.9 UTERINE FIBROID: Status: ACTIVE | Noted: 2022-10-17

## 2022-12-13 PROBLEM — C50.919 BREAST CANCER: Status: ACTIVE | Noted: 2022-10-25

## 2022-12-20 ENCOUNTER — APPOINTMENT (OUTPATIENT)
Dept: GYNECOLOGIC ONCOLOGY | Facility: CLINIC | Age: 37
End: 2022-12-20

## 2022-12-20 VITALS — HEART RATE: 65 BPM | TEMPERATURE: 98.2 F | SYSTOLIC BLOOD PRESSURE: 136 MMHG | DIASTOLIC BLOOD PRESSURE: 88 MMHG

## 2022-12-20 DIAGNOSIS — Z13.71 ENCOUNTER FOR NONPROCREATIVE SCREENING FOR GENETIC DISEASE CARRIER STATUS: ICD-10-CM

## 2022-12-20 DIAGNOSIS — C50.919 MALIGNANT NEOPLASM OF UNSPECIFIED SITE OF UNSPECIFIED FEMALE BREAST: ICD-10-CM

## 2022-12-20 DIAGNOSIS — D25.9 LEIOMYOMA OF UTERUS, UNSPECIFIED: ICD-10-CM

## 2022-12-20 PROCEDURE — 99024 POSTOP FOLLOW-UP VISIT: CPT

## 2022-12-20 RX ORDER — ALPRAZOLAM 0.25 MG/1
0.25 TABLET ORAL
Qty: 10 | Refills: 0 | Status: DISCONTINUED | COMMUNITY
Start: 2021-09-17 | End: 2022-12-20

## 2022-12-20 RX ORDER — TAMOXIFEN CITRATE 20 MG/1
20 TABLET, FILM COATED ORAL DAILY
Refills: 0 | Status: DISCONTINUED | COMMUNITY
Start: 2018-05-24 | End: 2022-12-20

## 2022-12-20 RX ORDER — TRANEXAMIC ACID 650 MG/1
650 TABLET ORAL 3 TIMES DAILY
Qty: 30 | Refills: 5 | Status: DISCONTINUED | COMMUNITY
Start: 2022-10-21 | End: 2022-12-20

## 2023-01-22 NOTE — ASU PATIENT PROFILE, ADULT - NS SC CAGE ALCOHOL CUT DOWN
on the discharge service for the patient. I have reviewed and made amendments to the documentation where necessary. no

## 2023-04-06 ENCOUNTER — APPOINTMENT (OUTPATIENT)
Dept: PLASTIC SURGERY | Facility: CLINIC | Age: 38
End: 2023-04-06

## 2023-05-23 NOTE — ASU DISCHARGE PLAN (ADULT/PEDIATRIC) - ACTIVITY LEVEL
Call to sister, Demarcus, to discuss.  Notified infusions were just approved today for the patient.  Demarcus given the number to the infusion center in [place] to call and make an appointment.  Verbalized understanding.   No excercise/No heavy lifting/No sports/gym

## 2024-03-12 NOTE — ASU PATIENT PROFILE, ADULT - NS PRO MODE OF ARRIVAL
Patient resting in bed with eyes closed. Respirations even and non labored. Safety Rounds done Q15 Minutes. Will continue to monitor.   ambulatory

## 2024-10-11 NOTE — HISTORY OF PRESENT ILLNESS
[de-identified] : Patient is here for right hip pain that began about 6 weeks ago without inciting injury. The pain is intermittent, worse with running. She feels it in different areas of her hip when performing different activities. She has seen a chiropractor for this issue. She had a similar pain about a year ago which she states took a long time to recover. She has continued biking and lifting weights. She has difficulty standing on one leg. Denies N/T/R/Prior injury. She has a history of breast cancer. \par \par The patient's past medical history, past surgical history, medications and allergies were reviewed by me today and documented accordingly. In addition, the patient's family and social history, which were noncontributory to this visit, were reviewed also. Intake form was reviewed. The patient has no family history of arthritis. 
home

## 2025-05-21 NOTE — ASU DISCHARGE PLAN (ADULT/PEDIATRIC) - ASU DC SPECIAL INSTRUCTIONSFT
What Type Of Note Output Would You Prefer (Optional)?: Standard Output Hpi Title: Evaluation of Skin Lesions How Severe Are Your Spot(S)?: mild Have Your Spot(S) Been Treated In The Past?: has not been treated Please take medications as prescribed    1. Duricef 500mg: take 1 tablet by mouth every 12 hours for two days.    2. Oxycodone 5mg: take 1 tablet by mouth every 4-6 hours as needed for pain.    Your post operative appointment is scheduled on 7/30/21 at 9:00am.

## (undated) DEVICE — PACK D&C

## (undated) DEVICE — STAPLER SKIN VISI-STAT 35 WIDE

## (undated) DEVICE — DRAPE LARGE SHEET 72X85"

## (undated) DEVICE — SOL IRR POUR NS 0.9% 500ML

## (undated) DEVICE — TROCAR SURGIQUEST AIRSEAL 5MM X 100MM

## (undated) DEVICE — SPECIMEN CONTAINER 100ML

## (undated) DEVICE — DRSG OPSITE 13.75 X 4"

## (undated) DEVICE — TROCAR SURGIQUEST AIRSEAL 8MMX100MM

## (undated) DEVICE — DRSG STERISTRIPS 0.5 X 4"

## (undated) DEVICE — UTERINE MANIPULATOR CONMED VCARE SM 32MM

## (undated) DEVICE — XI ARM SCISSOR MONO CURVED

## (undated) DEVICE — XI ARM FORCEP PROGRASP 8MM

## (undated) DEVICE — SUT POLYSORB 4-0 P-12 UNDYED

## (undated) DEVICE — XI ARM FORCEP FENESTRATED BIPOLAR 8MM

## (undated) DEVICE — PACK PERI GYN

## (undated) DEVICE — XI ARM NEEDLE DRIVER SUTURECUT MEGA 8MM

## (undated) DEVICE — SUT POLYSORB 0 30" GS-23

## (undated) DEVICE — VENODYNE/SCD SLEEVE CALF MEDIUM

## (undated) DEVICE — ENDOCATCH II 15MM

## (undated) DEVICE — DRSG MASTISOL

## (undated) DEVICE — GLV 6.5 PROTEXIS W HYDROGEL

## (undated) DEVICE — XI TIP COVER

## (undated) DEVICE — UTERINE MANIPULATOR CONMED VCARE MED 34MM

## (undated) DEVICE — D HELP - CLEARVIEW CLEARIFY SYSTEM

## (undated) DEVICE — GOWN TRIMAX LG

## (undated) DEVICE — LUBRICATING JELLY ONESHOT 1.25OZ

## (undated) DEVICE — SOL IRR POUR H2O 250ML

## (undated) DEVICE — BASIN SET DOUBLE

## (undated) DEVICE — TUBING STRYKEFLOW II SUCTION / IRRIGATOR

## (undated) DEVICE — WARMING BLANKET UPPER ADULT

## (undated) DEVICE — XI OBTURATOR OPTICAL BLADELESS 8MM

## (undated) DEVICE — XI DRAPE COLUMN

## (undated) DEVICE — XI ARM DISSECTOR CURVED BIPOLAR 8MM

## (undated) DEVICE — XI SEAL UNIV 5- 8 MM

## (undated) DEVICE — POSITIONER PURPLE ARM ONE STEP (LARGE)

## (undated) DEVICE — XI ARM GRASPER TIP UP FENESTRATED

## (undated) DEVICE — UTERINE MANIPULATOR CONMED VCARE LG 37MM

## (undated) DEVICE — PREP CHLOROHEXIDINE 4% 118CC KIT

## (undated) DEVICE — GOWN XXXL

## (undated) DEVICE — ENDOCATCH 10MM SPECIMEN POUCH

## (undated) DEVICE — SUT VLOC 180 0 9" GS-21 GREEN

## (undated) DEVICE — POSITIONER PINK PAD PIGAZZI SYSTEM

## (undated) DEVICE — TUBING AIRSEAL TRI-LUMEN FILTERED

## (undated) DEVICE — DRAPE 3/4 SHEET 52X76"

## (undated) DEVICE — FOLEY TRAY 16FR 5CC LF UMETER CLOSED

## (undated) DEVICE — XI DRAPE ARM

## (undated) DEVICE — PACK ROBOTIC LIJ

## (undated) DEVICE — POSITIONER FOAM HEAD CRADLE (PINK)

## (undated) DEVICE — XI ARM FORCEP MARYLAND BIPOLAR

## (undated) DEVICE — DRAPE INSTRUMENT POUCH 6.75" X 11"

## (undated) DEVICE — XI ARM NEEDLE DRIVER LARGE